# Patient Record
Sex: FEMALE | Race: WHITE | URBAN - METROPOLITAN AREA
[De-identification: names, ages, dates, MRNs, and addresses within clinical notes are randomized per-mention and may not be internally consistent; named-entity substitution may affect disease eponyms.]

---

## 2017-01-03 ENCOUNTER — PRE VISIT (OUTPATIENT)
Dept: DERMATOLOGY | Facility: CLINIC | Age: 37
End: 2017-01-03

## 2017-01-03 NOTE — TELEPHONE ENCOUNTER
Patients phone is not set up for voicemail. Unable to leave a voicemail to confirm appointment     Titi Thomas

## 2017-01-05 ENCOUNTER — OFFICE VISIT (OUTPATIENT)
Dept: DERMATOLOGY | Facility: CLINIC | Age: 37
End: 2017-01-05
Payer: COMMERCIAL

## 2017-01-05 DIAGNOSIS — L71.9 ROSACEA: Primary | ICD-10-CM

## 2017-01-05 DIAGNOSIS — L81.6 POIKILODERMA: ICD-10-CM

## 2017-01-05 DIAGNOSIS — D48.5 NEOPLASM OF UNCERTAIN BEHAVIOR OF SKIN OF CHEST: ICD-10-CM

## 2017-01-05 DIAGNOSIS — L57.8 SUN-DAMAGED SKIN: ICD-10-CM

## 2017-01-05 DIAGNOSIS — I78.1 TELANGIECTASIA: ICD-10-CM

## 2017-01-05 PROCEDURE — 99202 OFFICE O/P NEW SF 15 MIN: CPT | Performed by: DERMATOLOGY

## 2017-01-05 NOTE — NURSING NOTE
Dermatology Rooming Note    Jannie Kingsley's goals for this visit include:   Chief Complaint   Patient presents with     Derm Problem       Is a scribe okay for this visit:YES,     Are records needed for this visit(If yes, obtain release of information): No,      Vitals: There were no vitals taken for this visit.    Referring Provider:  No referring provider defined for this encounter.

## 2017-01-05 NOTE — PROGRESS NOTES
"[facility] Dermatology Note      Dermatology Problem List:  1. NUB on the chest: SK, BLK, NMSC. Recheck on 3 month follow up.  -s/p Pt deferred biopsy at this time. Pictures were taken.   2. Rosacea w/ Telangiectasia of face.  -s/p Discussed PDL    3. Frequent Tanning victoria use: will re-try talking her out of it @ next appt.    Encounter Date: Jan 5, 2017    CC:  Chief Complaint   Patient presents with     Derm Problem         History of Present Illness:  Ms. Jannie Kingsley is a 36 year old female who presents for evaluation of rosacea, skin naresh and dark spots. Today, the pt reports last year she noticed redness on her face and saw her Dermatologist Dr. Sharp. She was diagnosed with rosacea and was started on metro gel with no improvement. Her rosacea is characterized mainly by redness without bumps. It worsens when she gets out of the shower, noting \"my face gets really red.\" Pt uses makeup to hide the redness. Pt is not sure if spicy foods, sun-exposure, and red wine consumption effect her rosacea. Pt denies red gritty eyes. Pt admits to frequent use of tanning victoria.   In addition, pt notices a spot on her chest that has bleed when picked at. There is no other skin concerns at this time. Pt denies a hx of cold sores.     Past Medical History:   Patient Active Problem List   Diagnosis     Nexplanon in place     CARDIOVASCULAR SCREENING; LDL GOAL LESS THAN 160     Exercise-induced asthma     Bunion     Intermittent asthma     Past Medical History   Diagnosis Date     Asthma      Exercised induced      Seasonal allergies      Nexplanon in place      placed 1/3/13     Past Surgical History   Procedure Laterality Date     Breast surgery  11/2006     Implants, saline     Bunionectomy  11/1/13     left foot       Social History:  The patient works as a . The patient admits to frequent use of tanning beds. She admits to consumption of 7-14 alcoholic beverages a week and is a " nonsmoker.     Family History:  There is no family history of skin cancer or melanoma.    Medications:  Current Outpatient Prescriptions   Medication Sig Dispense Refill     spironolactone (ALDACTONE) 100 MG tablet TAKE ONE TABLET BY MOUTH ONE TIME DAILY 30 tablet 6     metroNIDAZOLE (METROGEL) 0.75 % gel Apply topically 2 times daily 45 g 11     glycopyrrolate (ROBINUL) 1 MG tablet Take 1 tablet (1 mg) by mouth 3 times daily 90 tablet 6     triamcinolone (KENALOG) 0.1 % cream Apply to affected area in axilla bid for 7-10 consecutive days, not to be used on face or groin 15 g 1     cetirizine (ZYRTEC) 10 MG tablet Take 1 tablet (10 mg) by mouth every evening 60 tablet 11     albuterol (PROAIR HFA, PROVENTIL HFA, VENTOLIN HFA) 108 (90 BASE) MCG/ACT inhaler Inhale 2 puffs into the lungs every 6 hours as needed for shortness of breath / dyspnea 1 Inhaler 0       Allergies   Allergen Reactions     Penicillins Rash     Fever       Review of Systems:  -Skin/Heme New Pt: The patient denies frequent sun exposure. The patient denies excessive scarring or problems healing except as per HPI. The patient denies excessive bleeding. The pt admits to frequent use of tanning booths.   -Constitutional: The patient is otherwise feeling well.       Physical exam:  Vitals: There were no vitals taken for this visit.  GEN: This is a well-nourished, well developed female in no acute distress  NEURO: Alert and oriented  PSYCH: in a pleasant mood, appropriate affect  SKIN: Focused examination of the Head, face, neck, upper chest was performed.  -There is redness across the cheek, across the nose, and chin.   -There is redness and telangiectasia, poikiloderma around anterior neck and upper chest.   -1.5 cm slightly pearly papule on right medial breast  -No other lesions of concern on areas examined.       Impression/Plan:  1. Neoplasm of uncertain behavior on the chest. DDX: BCC, SK, BLK. Given history of tanning victoria use, recommended  biopsy but patient deferred against medical recommendation.    Pt deferred biopsy at this time. She prefers to monitor lesion for any changes in size or appearance.    Pictures were taken today.   2. Rosacea w/ Telangiectasia     Discussed PDL treatment with pt. Pt is interested in this method of treatment and scheduled for cosmetic treatment. WArned that it may take 3 treatments to achieve adequate results. Side effect eye damage, bruising, redness, swelling, scarring among others.  3. 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    I really tried to talk the patient out of more tanning victoria use. I emphasize skin cancer, melanoma, death, skin aging but I dont' know if I really go through to her.       Follow-up in 3-6 months, earlier for new or changing lesions. And to re-examin lesion on the Right chest/medial breast.      Staff Involved:  Scribe/Staff      Scribe Disclosure:   I, Guerrero Walters, am serving as a scribe to document services personally performed by Dr. Pawel Obrien, based on data collection and the provider's statements to me.     Provider Disclosure:   I have reviewed the documentation recorded by the scribe and have edited it as needed. I have personally performed the services documented here and the documentation accurately represents those services and the decisions made by me.     Pawel Obrien MD, MS    Department of Dermatology  [facility]: Phone: [phone], Fax:[phone]  [facility]: Phone: [phone], Fax: [phone]

## 2017-01-05 NOTE — PATIENT INSTRUCTIONS
[facility]    I will experience redness, swelling, pain, and heat sensation. I may experience bruising, itching, or acne. Risks are blistering, oozing, permanent scarring, hair loss,  temporary or permanent skin lightening or darkening, infection, and eye injury. I understand my outcome could be no improvement, slight improvement. Multiple treatments may be required.    After treatment, Do Not:    Rub, scratch, or put weight on the site for 2 weeks    Wear tight fitting clothing or jewelry over the site    [place]. Keep the site out of sunlight. Use sunscreen of 30 SPF or greater when in the sun. Use sunscreen 30 minutes before going out and reapply if sweating. Tanning decreases the success of the treatment     How do I care for the treated site?    Use ice packs for 10 minutes after the procedure for swelling     If the site is on your face, use ice again 1 hour after treatment    If a scab or crust forms, gently cleanse the site with hydrogen peroxide. Then put on Vaseline  ointment 3 times a day    Do not use makeup on any open wound    What should I expect?    Blue-gray color that may take 2 to 3 weeks to go away    Redness may also last a week or longer    Results may take up to 3 or 4 months after treatment    More procedures may be needed    Who should I call with questions?    [facility]: [phone]     [facility]: [phone]    For urgent needs outside of business hours call the [facility] at [phone] and ask for the dermatology resident on call      For Full face PDL- 450$ each treatment.

## 2017-01-05 NOTE — MR AVS SNAPSHOT
After Visit Summary   1/5/2017    Jannie Kingsley    MRN: [id number]           Patient Information     Date Of Birth          1980        Visit Information        Provider Department      1/5/2017 1:00 PM Pawel Obrien MD [facility]        Today's Diagnoses     Rosacea    -  1     Telangiectasia         Neoplasm of uncertain behavior of skin of chest         Poikiloderma           Care Instructions    Pulse Dye Laser    I will experience redness, swelling, pain, and heat sensation. I may experience bruising, itching, or acne. Risks are blistering, oozing, permanent scarring, hair loss,  temporary or permanent skin lightening or darkening, infection, and eye injury. I understand my outcome could be no improvement, slight improvement. Multiple treatments may be required.    After treatment, Do Not:    Rub, scratch, or put weight on the site for 2 weeks    Wear tight fitting clothing or jewelry over the site    [place]claughlin. Keep the site out of sunlight. Use sunscreen of 30 SPF or greater when in the sun. Use sunscreen 30 minutes before going out and reapply if sweating. Tanning decreases the success of the treatment     How do I care for the treated site?    Use ice packs for 10 minutes after the procedure for swelling     If the site is on your face, use ice again 1 hour after treatment    If a scab or crust forms, gently cleanse the site with hydrogen peroxide. Then put on Vaseline  ointment 3 times a day    Do not use makeup on any open wound    What should I expect?    Blue-gray color that may take 2 to 3 weeks to go away    Redness may also last a week or longer    Results may take up to 3 or 4 months after treatment    More procedures may be needed    Who should I call with questions?    [facility]: [phone]     [facility]: [phone]    For urgent needs outside of business hours call the [facility] at  [phone] and ask for the dermatology resident on call      For Full face PDL- 450$ each treatment.         Follow-ups after your visit        Your next 10 appointments already scheduled     Jan 13, 2017  2:00 PM   Office Visit with Beatrice Madison MD   [facility] ([facility] )    [address] [place] [address]  [address] [place] [id number] [phone]           Bring a current list of meds and any records pertaining to this visit.  For Physicals, please bring immunization records and any forms needing to be filled out.  Please arrive 10 minutes early to complete paperwork.            Feb 10, 2017 10:00 AM   Return Visit with Pawel Obrien MD   [facility] ([facility])    [address] [place] [id number] [phone]              Who to contact     If you have questions or need follow up information about today's clinic visit or your schedule please contact [facility] directly at [phone].  Normal or non-critical lab and imaging results will be communicated to you by [employer], letter or phone within 4 business days after the clinic has received the results. If you do not hear from us within 7 days, please contact the clinic through [employer] or phone. If you have a critical or abnormal lab result, we will notify you by phone as soon as possible.  Submit refill requests through [employer] or call your pharmacy and they will forward the refill request to us. Please allow 3 business days for your refill to be completed.          Additional Information About Your Visit        [employer] Information     [employer] gives you secure access to your electronic health record. If you see a primary care provider, you can also send messages to your care team and make appointments. If you have questions, please call your primary care clinic.  If you do not have a primary care provider, please call  [phone] and they will assist you.      [employer] is an electronic gateway that provides easy, online access to your medical records. With [employer], you can request a clinic appointment, read your test results, renew a prescription or communicate with your care team.     To access your existing account, please contact your [facility] or call [phone] for assistance.        Care EveryWhere ID     This is your Care EveryWhere ID. This could be used by other organizations to access your [facility] medical records  [id number]         Blood Pressure from Last 3 Encounters:   06/03/14 112/76   02/20/14 118/68   12/31/13 118/82    Weight from Last 3 Encounters:   06/03/14 57.108 kg (125 lb 14.4 oz)   02/20/14 56.7 kg (125 lb)   12/31/13 59.784 kg (131 lb 12.8 oz)              Today, you had the following     No orders found for display       Primary Care Provider Office Phone # Fax #    TRAN [facility] [phone]       [facility] [address] [facility] [address]        Thank you!     Thank you for choosing [facility]  for your care. Our goal is always to provide you with excellent care. Hearing back from our patients is one way we can continue to improve our services. Please take a few minutes to complete the written survey that you may receive in the mail after your visit with us. Thank you!             Your Updated Medication List - Protect others around you: Learn how to safely use, store and throw away your medicines at www.disposemymeds.org.          This list is accurate as of: 1/5/17  1:18 PM.  Always use your most recent med list.                   Brand Name Dispense Instructions for use    albuterol 108 (90 BASE) MCG/ACT Inhaler    PROAIR HFA/PROVENTIL HFA/VENTOLIN HFA    1 Inhaler    Inhale 2 puffs into the lungs every 6 hours as needed for shortness of breath / dyspnea       cetirizine 10 MG tablet    zyrTEC    60  tablet    Take 1 tablet (10 mg) by mouth every evening       glycopyrrolate 1 MG tablet    ROBINUL    90 tablet    Take 1 tablet (1 mg) by mouth 3 times daily       metroNIDAZOLE 0.75 % topical gel    METROGEL    45 g    Apply topically 2 times daily       spironolactone 100 MG tablet    ALDACTONE    30 tablet    TAKE ONE TABLET BY MOUTH ONE TIME DAILY       triamcinolone 0.1 % cream    KENALOG    15 g    Apply to affected area in axilla bid for 7-10 consecutive days, not to be used on face or groin

## 2017-01-13 ENCOUNTER — OFFICE VISIT (OUTPATIENT)
Dept: FAMILY MEDICINE | Facility: CLINIC | Age: 37
End: 2017-01-13
Payer: COMMERCIAL

## 2017-01-13 DIAGNOSIS — L74.510 AXILLARY HYPERHIDROSIS: Primary | ICD-10-CM

## 2017-01-13 PROCEDURE — 64650 CHEMODENERV ECCRINE GLANDS: CPT | Performed by: FAMILY MEDICINE

## 2017-01-13 NOTE — MR AVS SNAPSHOT
After Visit Summary   1/13/2017    Jannie Kingsley    MRN: [id number]           Patient Information     Date Of Birth          1980        Visit Information        Provider Department      1/13/2017 2:00 PM Beatrice Madison MD [facility]        Today's Diagnoses     Axillary hyperhidrosis    -  1       Care Instructions    FUTURE APPOINTMENTS  Fllow up as needed in 4-6 months.    HYPERHIDROSIS POST-TREATMENT CARE INSTRUCTIONS  Gently apply a cool compress or wrapped ice pack to the treated areas for 15 minutes every few hours as needed to reduce discomfort, swelling, or bruising up to a few days after treatment. If bruising should occur, it will typically resolve within 7-10 days.    Avoid these on the day of treatment:    Do not massage the treated areas.    Do not apply deodorant on the treated areas for 24 hours.    Avoid applying heat to the treated area.    Avoid activities such as hot tub/sauna use, exercise, tanning and other activities that can cause flushing.    Shave underarms and do not use over-the-counter deodorants or anti-perspirants for 24 hours prior to next treatment.    Botulinium toxin (BOTOX) treatment effects take 1-2 weeks to fully develop and last for approximately 6 months.    Signs of Infection:  Infection can occur in any area where skin has been disrupted.  If you notice persistent redness, swelling, colored drainage, increasing pain, fever or other signs of infection, please call us at: [phone] and ask to have me or my colleague paged. We will call you back to discuss.    HYPERHIDROSIS PRE-TREATMENT CARE INSTRUCTIONS    Shave underarms and do not use over-the-counter deodorants or anti-perspirants for 24 hours prior to treatment.    Avoid ibuprofen (e.g. Advil, Motrin) for 2 days prior to treatment.    Avoid alcohol for 2 days prior to treatment.    Avoid aspirin (e.g. Excedrin) and any products containing acetylsalicylic  acid, Vitamin E, Ishpeming's Wort and other dietary supplements including Evening Primrose, Feverfew, Garlic, Ginkgo, and Ginseng for 2 weeks prior to treatment.        Follow-ups after your visit        Your next 10 appointments already scheduled     Feb 10, 2017 10:00 AM   Return Visit with Pawel Obrien MD   [facility] ([facility])    [id number] [place] [id number] [phone]              Who to contact     If you have questions or need follow up information about today's clinic visit or your schedule please contact [facility] directly at [phone].  Normal or non-critical lab and imaging results will be communicated to you by MyChart, letter or phone within 4 business days after the clinic has received the results. If you do not hear from us within 7 days, please contact the clinic through [employer] or phone. If you have a critical or abnormal lab result, we will notify you by phone as soon as possible.  Submit refill requests through [employer] or call your pharmacy and they will forward the refill request to us. Please allow 3 business days for your refill to be completed.          Additional Information About Your Visit        MyChart Information     [employer] gives you secure access to your electronic health record. If you see a primary care provider, you can also send messages to your care team and make appointments. If you have questions, please call your primary care clinic.  If you do not have a primary care provider, please call [phone] and they will assist you.        Care EveryWhere ID     This is your Care EveryWhere ID. This could be used by other organizations to access your [facility] medical records  [id number]         Blood Pressure from Last 3 Encounters:   06/03/14 112/76   02/20/14 118/68   12/31/13 118/82    Weight from Last 3 Encounters:   06/03/14 125 lb 14.4 oz (57.108 kg)   02/20/14 125 lb (56.7 kg)    12/31/13 131 lb 12.8 oz (59.784 kg)              Today, you had the following     No orders found for display       Primary Care Provider Office Phone # Fax #    [facility] [phone]       [facility] [address] [facility] [address]        Thank you!     Thank you for choosing [facility]  for your care. Our goal is always to provide you with excellent care. Hearing back from our patients is one way we can continue to improve our services. Please take a few minutes to complete the written survey that you may receive in the mail after your visit with us. Thank you!             Your Updated Medication List - Protect others around you: Learn how to safely use, store and throw away your medicines at www.disposemymeds.org.          This list is accurate as of: 1/13/17  2:17 PM.  Always use your most recent med list.                   Brand Name Dispense Instructions for use    albuterol 108 (90 BASE) MCG/ACT Inhaler    PROAIR HFA/PROVENTIL HFA/VENTOLIN HFA    1 Inhaler    Inhale 2 puffs into the lungs every 6 hours as needed for shortness of breath / dyspnea       cetirizine 10 MG tablet    zyrTEC    60 tablet    Take 1 tablet (10 mg) by mouth every evening       glycopyrrolate 1 MG tablet    ROBINUL    90 tablet    Take 1 tablet (1 mg) by mouth 3 times daily       spironolactone 100 MG tablet    ALDACTONE    30 tablet    TAKE ONE TABLET BY MOUTH ONE TIME DAILY       triamcinolone 0.1 % cream    KENALOG    15 g    Apply to affected area in axilla bid for 7-10 consecutive days, not to be used on face or groin

## 2017-01-13 NOTE — PATIENT INSTRUCTIONS
FUTURE APPOINTMENTS  Fllow up as needed in 4-6 months.    HYPERHIDROSIS POST-TREATMENT CARE INSTRUCTIONS  Gently apply a cool compress or wrapped ice pack to the treated areas for 15 minutes every few hours as needed to reduce discomfort, swelling, or bruising up to a few days after treatment. If bruising should occur, it will typically resolve within 7-10 days.    Avoid these on the day of treatment:    Do not massage the treated areas.    Do not apply deodorant on the treated areas for 24 hours.    Avoid applying heat to the treated area.    Avoid activities such as hot tub/sauna use, exercise, tanning and other activities that can cause flushing.    Shave underarms and do not use over-the-counter deodorants or anti-perspirants for 24 hours prior to next treatment.    Botulinium toxin (BOTOX) treatment effects take 1-2 weeks to fully develop and last for approximately 6 months.    Signs of Infection:  Infection can occur in any area where skin has been disrupted.  If you notice persistent redness, swelling, colored drainage, increasing pain, fever or other signs of infection, please call us at: [phone] and ask to have me or my colleague paged. We will call you back to discuss.    HYPERHIDROSIS PRE-TREATMENT CARE INSTRUCTIONS    Shave underarms and do not use over-the-counter deodorants or anti-perspirants for 24 hours prior to treatment.    Avoid ibuprofen (e.g. Advil, Motrin) for 2 days prior to treatment.    Avoid alcohol for 2 days prior to treatment.    Avoid aspirin (e.g. Excedrin) and any products containing acetylsalicylic acid, Vitamin E, Khris's Wort and other dietary supplements including Evening Primrose, Feverfew, Garlic, Ginkgo, and Ginseng for 2 weeks prior to treatment.

## 2017-01-13 NOTE — PROGRESS NOTES
[facility] - PRIMARY CARE SKIN    CC : axillary hyperhidrosis  SUBJECTIVE:                                                    Jannie Kingsley is a 35 year old female who presents to clinic today with  for follow-up if hyperhidrosis, which has been an issue over the last 2-3 years. Use of topical deodorant is irritating to the skin.    Previous BOTOX injection treatments :   4/15/2016 - 100 units with excellent results.  8/12/2016 - 100 units with excellent results.    Excessive sweating impact : Destroys room clothing, interference with work environment and social inhibition.  Previous ineffective treatments : OTC and prescription anti-perspirants, Robinul.  Family history of excessive sweating : NO, thyroid disorder : NO.    Occupation :  (indoor).    Patient Active Problem List   Diagnosis     Nexplanon in place     CARDIOVASCULAR SCREENING; LDL GOAL LESS THAN 160     Exercise-induced asthma     Bunion     Intermittent asthma       Past Medical History   Diagnosis Date     Asthma      Exercised induced      Seasonal allergies      Nexplanon in place      placed 1/3/13    Past Surgical History   Procedure Laterality Date     Breast surgery  11/2006     Implants, saline     Bunionectomy  11/1/13     left foot      Social History   Substance Use Topics     Smoking status: Never Smoker      Smokeless tobacco: Never Used     Alcohol Use: 2.0 oz/week     4 drink(s) per week      Comment: 4 drinks per week    Family History     Problem (# of Occurrences) Relation (Name,Age of Onset)    CANCER (2) Mother: cervical, Maternal Grandmother: Stomach cancer       Negative family history of: Asthma, C.A.D., DIABETES, Hypertension, CEREBROVASCULAR DISEASE, Breast Cancer, Cancer - colorectal, Prostate Cancer, Alzheimer Disease, Anesthesia Reaction, Arthritis, Alcohol/Drug, Allergies, Blood Disease, Cardiovascular, Connective Tissue Disorder, Congenital Anomalies, Circulatory, Depression, Endocrine Disease,  Eye Disorder, Genetic Disorder, GASTROINTESTINAL DISEASE, Genitourinary Problems, Gynecology, HEART DISEASE, Lipids, Musculoskeletal Disorder, Neurologic Disorder, Obesity, OSTEOPOROSIS, Psychotic Disorder, Respiratory, Thyroid Disease, Family History Negative, Hearing Loss           Prescription Medications as of 1/13/2017             spironolactone (ALDACTONE) 100 MG tablet TAKE ONE TABLET BY MOUTH ONE TIME DAILY    metroNIDAZOLE (METROGEL) 0.75 % gel Apply topically 2 times daily    glycopyrrolate (ROBINUL) 1 MG tablet Take 1 tablet (1 mg) by mouth 3 times daily    triamcinolone (KENALOG) 0.1 % cream Apply to affected area in axilla bid for 7-10 consecutive days, not to be used on face or groin    cetirizine (ZYRTEC) 10 MG tablet Take 1 tablet (10 mg) by mouth every evening    albuterol (PROAIR HFA, PROVENTIL HFA, VENTOLIN HFA) 108 (90 BASE) MCG/ACT inhaler Inhale 2 puffs into the lungs every 6 hours as needed for shortness of breath / dyspnea            Allergies   Allergen Reactions     Penicillins Rash     Fever        ROS : 14 point review of systems was negative except the symptoms listed above in the HPI.    This document serves as a record of the services and decisions personally performed and made by Tabatha Madison MD. It was created on her behalf by Jackson Gresham, a trained medical scribe.  The creation of this document is based on the scribe's personal observations and the provider's statements to the medical scribe.  Jackson Gresham, January 13, 2017 1:48 PM      OBJECTIVE:                                                    GENERAL: healthy, alert and no distress  SKIN: Wong Skin Type - I.  Trunk were examined. The dermatoscope was used to help evaluate pigmented lesions.  Skin Pertinent Findings:  Axillae : Skin appears healthy.     Diagnostic Test Results:  none     MDM :  Discussed compliance with medication, change in treatment plan, and expectations for treatment response. Treatment options for  control of the axillary sweating was previously discussed.      ASSESSMENT:                                                      Encounter Diagnosis   Name Primary?     Axillary hyperhidrosis Yes         PLAN:                                                      Patient Instructions   FUTURE APPOINTMENTS  Fllow up as needed in 4-6 months.    HYPERHIDROSIS POST-TREATMENT CARE INSTRUCTIONS  Gently apply a cool compress or wrapped ice pack to the treated areas for 15 minutes every few hours as needed to reduce discomfort, swelling, or bruising up to a few days after treatment. If bruising should occur, it will typically resolve within 7-10 days.    Avoid these on the day of treatment:    Do not massage the treated areas.    Do not apply deodorant on the treated areas for 24 hours.    Avoid applying heat to the treated area.    Avoid activities such as hot tub/sauna use, exercise, tanning and other activities that can cause flushing.    Shave underarms and do not use over-the-counter deodorants or anti-perspirants for 24 hours prior to next treatment.    Botulinium toxin (BOTOX) treatment effects take 1-2 weeks to fully develop and last for approximately 6 months.    Signs of Infection:  Infection can occur in any area where skin has been disrupted.  If you notice persistent redness, swelling, colored drainage, increasing pain, fever or other signs of infection, please call us at: [phone] and ask to have me or my colleague paged. We will call you back to discuss.    HYPERHIDROSIS PRE-TREATMENT CARE INSTRUCTIONS    Shave underarms and do not use over-the-counter deodorants or anti-perspirants for 24 hours prior to treatment.    Avoid ibuprofen (e.g. Advil, Motrin) for 2 days prior to treatment.    Avoid alcohol for 2 days prior to treatment.    Avoid aspirin (e.g. Excedrin) and any products containing acetylsalicylic acid, Vitamin E, Khris's Wort and other dietary supplements including Evening Primrose,  "Feverfew, Garlic, Ginkgo, and Ginseng for 2 weeks prior to treatment.        PROCEDURES:                                                    Name : Botulinum Toxin (BOTOX) injection.  Indication : axillary hyperhidrosis with previously failed conservative treatment.  Location(s) : Both axillae.  Completed by : Tabatha Madison MD  Note : Discussed the risk of pain, infection, scarring, hypo- or hyperpigmentation and recurrence or need for re-treatment. Written pre- and post- treatment instructions were given to the patient and reviewed. The benefits and risks of treatment and alternative treatments were also discussed.    During this procedure, the universal protocol was utilized. The patient's identity was confirmed by no less than two patient identifiers, correct procedure was verified, correct site was verified and marked as applicable and a final pause was completed.    Sterile technique was used throughout the procedure. The skin was cleaned and prepped with surgical cleanser. Intradermal injections with BOTOX were done using a 32 gauge 1/2\" needle, creating a small wheal, spaced 1 cm apart and in a staggered fashion. No bleeding was present upon the completion of the procedure.    Botox : Lot: # [id number], Expiration date: Aug 2019.  Total units injected : 100 units.    Patient tolerated procedure well and left the room in satisfactory condition.  Treatment number : 3.    The information in this document, created by the medical scribe for me, accurately reflects the services I personally performed and the decisions made by me. I have reviewed and approved this document for accuracy prior to leaving the patient care area.  Tabatha Madison MD January 13, 2017 1:49 PM  [facility] - PRIMARY CARE SKIN  "

## 2017-02-10 ENCOUNTER — OFFICE VISIT (OUTPATIENT)
Dept: DERMATOLOGY | Facility: CLINIC | Age: 37
End: 2017-02-10

## 2017-02-10 DIAGNOSIS — L71.9 ROSACEA: Primary | ICD-10-CM

## 2017-02-10 PROCEDURE — 96999 UNLISTED SPEC DERM SVC/PX: CPT | Performed by: DERMATOLOGY

## 2017-02-10 NOTE — PROGRESS NOTES
Laser- VBeam(Pulsed Dye Laser) Procedure Note: Cosmetic    Procedure Date: Feb 10, 2017    Attending Staff Surgeon: Dr. Pawel Obrien    Resident Surgeon: None    Assistant: Kelsey Grace    Operating Room Data:     Surgery/Procedure Date:    SAME     Pre-operative Diagnosis:   ET Rosacea  Location: bilateral cheeks, nose, chin  Size: more than 10 but less than 50 square cm    Operation/Procedure    Vbeam pulsed dye laser treatment#: 1       Post-operative Diagnosis:  SAME    Laser Settings:  Energy: 7 J/cm2  Spot size:10mm  Pulse width:  6 mS (0.45 thru 40 mS)  Dynamic cooling spray settin mS  Dynamic cooling device delay:  20 mS      Anesthesia:  None    Description of Operation/Procedure:   The nature and purpose of the procedure, associated risks, possible consequences, complications and alternative methods of treatment were explained in detail, this includes but is not limited to hyperpigmentation, hypopigmentation, scarring, bruising, hair loss pain/discomfort, eye injury, and blister.   We reviewed that the outcome could be any of the following: no improvement, slight improvement or change in skin color & texture, the skin might be permanently lighter or darker, and though uncommon, superficial scarring may occur.  Multiple treatments may be recommended.   A photo  and operative consent were obtained. Time-out was performed.  The patient was positioned to optimally expose the area treated.  Protective eyewear was worn by the patient and goggles on all personnel in the treatment room.  The laser energy output was verified by meter reading.     The clinically evident lesion(s) was/were treated with Myesha Vbeam pulsed dye laser (595 nm) beam as above.  A total of 231 pulses were used.  The patient tolerated the procedure well and no complications were noted. The total laser operation and preparation time was 30 minutes.      The patient will pay the cosmetic fee today --> 4 sites.     Dr. Obrien staffed the  patient was present for the entire procedure.    The patient will follow-up in 1 month.      Staff Involved:  Staff Only      Pawel Obrien MD, MS      Department of Dermatology   [facility]: Phone: [phone], Fax:[phone]   [facility]: Phone: [phone], Fax: [phone]    Pre-operative photos:

## 2017-02-10 NOTE — MR AVS SNAPSHOT
After Visit Summary   2/10/2017    Jannie Dexter    MRN: [id number]           Patient Information     Date Of Birth          1980        Visit Information        Provider Department      2/10/2017 10:00 AM Pawel Obrien MD [facility]        Care Instructions    Pulse Dye Laser    I will experience redness, swelling, pain, and heat sensation. I may experience bruising, itching, or acne. Risks are blistering, oozing, permanent scarring, hair loss,  temporary or permanent skin lightening or darkening, infection, and eye injury. I understand my outcome could be no improvement, slight improvement. Multiple treatments may be required.    After treatment, Do Not:    Rub, scratch, or put weight on the site for 2 weeks    Wear tight fitting clothing or jewelry over the site    Mclaughlin. Keep the site out of sunlight. Use sunscreen of 30 SPF or greater when in the sun. Use sunscreen 30 minutes before going out and reapply if sweating. Tanning decreases the success of the treatment     How do I care for the treated site?    Use ice packs for 10 minutes after the procedure for swelling     If the site is on your face, use ice again 1 hour after treatment    If a scab or crust forms, gently cleanse the site with hydrogen peroxide. Then put on Vaseline  ointment 3 times a day    Do not use makeup on any open wound    What should I expect?    Blue-gray color that may take 2 to 3 weeks to go away    Redness may also last a week or longer    Results may take up to 3 or 4 months after treatment    More procedures may be needed    Who should I call with questions?    [facility]: [phone]     [facility]: [phone]    For urgent needs outside of business hours call the [facility] at [phone] and ask for the dermatology resident on call        Follow-ups after your visit        Your next 10 appointments already  scheduled     Mar 13, 2017 11:15 AM   Return Visit with Pawel Obrien MD   [facility]    [address] [address] [place] [id number] [phone]              Who to contact     If you have questions or need follow up information about today's clinic visit or your schedule please contact [facility] directly at [phone].  Normal or non-critical lab and imaging results will be communicated to you by [employer], letter or phone within 4 business days after the clinic has received the results. If you do not hear from us within 7 days, please contact the clinic through [employer] or phone. If you have a critical or abnormal lab result, we will notify you by phone as soon as possible.  Submit refill requests through [employer] or call your pharmacy and they will forward the refill request to us. Please allow 3 business days for your refill to be completed.          Additional Information About Your Visit        [employer] Information     [employer] gives you secure access to your electronic health record. If you see a primary care provider, you can also send messages to your care team and make appointments. If you have questions, please call your primary care clinic.  If you do not have a primary care provider, please call [phone] and they will assist you.      [employer] is an electronic gateway that provides easy, online access to your medical records. With [employer], you can request a clinic appointment, read your test results, renew a prescription or communicate with your care team.     To access your existing account, please contact your [facility] or call [phone] for assistance.        Care EveryWhere ID     This is your Care EveryWhere ID. This could be used by other organizations to access your [facility] medical records  [id number]         Blood Pressure from Last 3 Encounters:   06/03/14 112/76   02/20/14 118/68    12/31/13 118/82    Weight from Last 3 Encounters:   06/03/14 57.108 kg (125 lb 14.4 oz)   02/20/14 56.7 kg (125 lb)   12/31/13 59.784 kg (131 lb 12.8 oz)              Today, you had the following     No orders found for display       Primary Care Provider Office Phone # Fax #    Devi Valenzuela TRAN [facility] [phone]       [facility] [address] [facility] [address]        Thank you!     Thank you for choosing [facility]  for your care. Our goal is always to provide you with excellent care. Hearing back from our patients is one way we can continue to improve our services. Please take a few minutes to complete the written survey that you may receive in the mail after your visit with us. Thank you!             Your Updated Medication List - Protect others around you: Learn how to safely use, store and throw away your medicines at www.disposemymeds.org.          This list is accurate as of: 2/10/17 10:22 AM.  Always use your most recent med list.                   Brand Name Dispense Instructions for use    albuterol 108 (90 BASE) MCG/ACT Inhaler    PROAIR HFA/PROVENTIL HFA/VENTOLIN HFA    1 Inhaler    Inhale 2 puffs into the lungs every 6 hours as needed for shortness of breath / dyspnea       cetirizine 10 MG tablet    zyrTEC    60 tablet    Take 1 tablet (10 mg) by mouth every evening       glycopyrrolate 1 MG tablet    ROBINUL    90 tablet    Take 1 tablet (1 mg) by mouth 3 times daily       spironolactone 100 MG tablet    ALDACTONE    30 tablet    TAKE ONE TABLET BY MOUTH ONE TIME DAILY       triamcinolone 0.1 % cream    KENALOG    15 g    Apply to affected area in axilla bid for 7-10 consecutive days, not to be used on face or groin

## 2017-02-10 NOTE — PATIENT INSTRUCTIONS
Pulse Dye Laser    I will experience redness, swelling, pain, and heat sensation. I may experience bruising, itching, or acne. Risks are blistering, oozing, permanent scarring, hair loss,  temporary or permanent skin lightening or darkening, infection, and eye injury. I understand my outcome could be no improvement, slight improvement. Multiple treatments may be required.    After treatment, Do Not:    Rub, scratch, or put weight on the site for 2 weeks    Wear tight fitting clothing or jewelry over the site    Mclaughlin. Keep the site out of sunlight. Use sunscreen of 30 SPF or greater when in the sun. Use sunscreen 30 minutes before going out and reapply if sweating. Tanning decreases the success of the treatment     How do I care for the treated site?    Use ice packs for 10 minutes after the procedure for swelling     If the site is on your face, use ice again 1 hour after treatment    If a scab or crust forms, gently cleanse the site with hydrogen peroxide. Then put on Vaseline  ointment 3 times a day    Do not use makeup on any open wound    What should I expect?    Blue-gray color that may take 2 to 3 weeks to go away    Redness may also last a week or longer    Results may take up to 3 or 4 months after treatment    More procedures may be needed    Who should I call with questions?    [facility]: [phone]     [facility]: [phone]    For urgent needs outside of business hours call the [facility] at [phone] and ask for the dermatology resident on call

## 2017-02-10 NOTE — NURSING NOTE
Dermatology Rooming Note    Jannie Dexter's goals for this visit include:   Chief Complaint   Patient presents with     Procedure       Is a scribe okay for this visit:Yes      Are records needed for this visit(If yes, obtain release of information): No,      Vitals: There were no vitals taken for this visit.    Referring Provider:  No referring provider defined for this encounter.

## 2017-03-13 ENCOUNTER — OFFICE VISIT (OUTPATIENT)
Dept: DERMATOLOGY | Facility: CLINIC | Age: 37
End: 2017-03-13
Payer: COMMERCIAL

## 2017-03-13 DIAGNOSIS — D48.5 NEOPLASM OF UNCERTAIN BEHAVIOR OF SKIN OF CHEST: ICD-10-CM

## 2017-03-13 DIAGNOSIS — L71.9 ROSACEA: Primary | ICD-10-CM

## 2017-03-13 PROCEDURE — 88305 TISSUE EXAM BY PATHOLOGIST: CPT | Performed by: DERMATOLOGY

## 2017-03-13 PROCEDURE — 99213 OFFICE O/P EST LOW 20 MIN: CPT | Mod: 25 | Performed by: DERMATOLOGY

## 2017-03-13 PROCEDURE — 11100 HC BIOPSY SKIN/SUBQ/MUC MEM, SINGLE LESION: CPT | Performed by: DERMATOLOGY

## 2017-03-13 RX ORDER — LIDOCAINE HYDROCHLORIDE AND EPINEPHRINE 10; 10 MG/ML; UG/ML
3 INJECTION, SOLUTION INFILTRATION; PERINEURAL ONCE
Qty: 0.5 ML | Refills: 0 | OUTPATIENT
Start: 2017-03-13 | End: 2017-03-13

## 2017-03-13 NOTE — PROGRESS NOTES
[facility] Dermatology Note      Dermatology Problem List:  1. NUB on the chest: SCC, BCC, BLK, AK, irritated cherry angioma.   -s/p shave biopsy 3/13/2017  2. Rosacea w/ Telangiectasia of face.  -s/p PDL 2/10/2017 (full face)  -Anticipated Next PDL settings --> decreasing purpura  Energy: 8 J/cm2 (Inc from 7)  Spot size:10mm  Pulse width: 10 mS (Inc from 6)  3. Frequent Tanning victoria use: will continue talking her out of it    Encounter Date: Mar 13, 2017    CC:  Chief Complaint   Patient presents with     RECHECK     post pdl 4 weeks redness is better but not where she wants it yet          History of Present Illness:  Ms. Jannie Kingsley is a 36 year old female who presents as a follow up post PDL treatment for rosacea and NUB on chest. She was last seen 2/10/2017 when pt underwent PDL for rosacea. Today, the pt reports the PDL improved the outside of her cheeks and parts of her nose but pt states the bruising was really intense. She is otherwise pleased with her results and is ready for another round of treatment. Her rosacea worsens right after a hot shower. She is interested in another session but will do it after her wedding and honeymoon in [place].  In addition, pt states she is ready to biopsy the spot on her chest. It has been growing and shrinking in size, as well as, spontaneously bleeding. No other skin concerns at this time.     Past Medical History:   Patient Active Problem List   Diagnosis     Nexplanon in place     CARDIOVASCULAR SCREENING; LDL GOAL LESS THAN 160     Exercise-induced asthma     Bunion     Intermittent asthma     Past Medical History   Diagnosis Date     Asthma      Exercised induced      Nexplanon in place      placed 1/3/13     Seasonal allergies      Past Surgical History   Procedure Laterality Date     Breast surgery  11/2006     Implants, saline     Bunionectomy  11/1/13     left foot       Social History:  Wedding and [employer] in [place] in May 2017  The  patient works as a . The patient admits to frequent use of tanning beds. She admits to consumption of 7-14 alcoholic beverages a week and is a nonsmoker.     Family History:  Reviewed and unchanged but kept in chart for clinician convenience  There is no family history of skin cancer or melanoma.    Medications:  Current Outpatient Prescriptions   Medication Sig Dispense Refill     spironolactone (ALDACTONE) 100 MG tablet TAKE ONE TABLET BY MOUTH ONE TIME DAILY 30 tablet 6     glycopyrrolate (ROBINUL) 1 MG tablet Take 1 tablet (1 mg) by mouth 3 times daily 90 tablet 6     triamcinolone (KENALOG) 0.1 % cream Apply to affected area in axilla bid for 7-10 consecutive days, not to be used on face or groin 15 g 1     cetirizine (ZYRTEC) 10 MG tablet Take 1 tablet (10 mg) by mouth every evening 60 tablet 11     albuterol (PROAIR HFA, PROVENTIL HFA, VENTOLIN HFA) 108 (90 BASE) MCG/ACT inhaler Inhale 2 puffs into the lungs every 6 hours as needed for shortness of breath / dyspnea 1 Inhaler 0       Allergies   Allergen Reactions     Penicillins Rash     Fever       Review of Systems:  -Constitutional: The patient is otherwise feeling well.       Physical exam:  Vitals: There were no vitals taken for this visit.  GEN: This is a well-nourished, well developed female in no acute distress  NEURO: Alert and oriented  PSYCH: in a pleasant mood, appropriate affect  SKIN: Focused examination of the Head, face, neck, upper chest was performed.  -There is mild erythema on the forehead and lateral face, improved since prior to PDL  -resolution of telangiectasias on the cheeks.  -There are minimal lacy erythema on the forehead and nose.   -On the right medial chest there is a 8 mm sized pink papule with excoriation.  -No other lesions of concern on areas examined.       Impression/Plan:  1. Neoplasm of uncertain behavior on the right chest. DDX: BCC, SCC, BLK, AK, irritated cherry angioma . Given history of tanning victoria  use.    Shave biopsy:  After discussion of benefits and risks including but not limited to bleeding/bruising, pain/swelling, infection, scar, incomplete removal, nerve damage/numbness, recurrence, and non-diagnostic biopsy, written consent, verbal consent and photographs were obtained. Time-out was performed. The area was cleaned with isopropyl alcohol. 0.5mL of 1% lidocaine with epinephrine was injected to obtain adequate anesthesia of the lesion on the right chest. A shave biopsy was performed. Hemostasis was achieved with aluminium chloride. Vaseline and a sterile dressing were applied. The patient tolerated the procedure and no complications were noted. The patient was provided with verbal and written post care instructions.   2. Rosacea w/ Telangiectasia: improved with 1 PDL. New photos taken. Pt will need another PDL but focused on the nose and cheek but better AFTER wedding in case things other.  Will only treat nose and cheeks most likely.     Pt is interested in another round of PDL treatment.  method of treatment and scheduled for cosmetic treatment. WArned that it may take 3 treatments to achieve adequate results. Side effect eye damage, bruising, redness, swelling, scarring among others.      Follow-up in prn pending biopsy results. Can schedule at her leisure for another PDL for rosacea/redness      Staff Involved:  Scribe/Staff      Scribe Disclosure:   I, Guerrero Walters, am serving as a scribe to document services personally performed by Dr. Pawel Obrien, based on data collection and the provider's statements to me.     Provider Disclosure:   I have reviewed the documentation recorded by the scribe and have edited it as needed. I have personally performed the services documented here and the documentation accurately represents those services and the decisions made by me.     Pawel Obrien MD, MS    Department of Dermatology  [facility]inics: Phone: [phone], Fax:[phone]  [facility]: Phone: [phone], Fax: [phone]             1/05/2017                                                                                                   3/13/2017

## 2017-03-13 NOTE — MR AVS SNAPSHOT
After Visit Summary   3/13/2017    Jannie Dexter    MRN: [id number]           Patient Information     Date Of Birth          1980        Visit Information        Provider Department      3/13/2017 11:15 AM Pawel Obrien MD [facility]        Today's Diagnoses     Rosacea    -  1    Neoplasm of uncertain behavior of skin of chest          Care Instructions    Wound Care After a Biopsy    What is a skin biopsy?  A skin biopsy allows the doctor to examine a very small piece of tissue under the microscope to determine the diagnosis and the best treatment for the skin condition. A local anesthetic (numbing medicine)  is injected with a very small needle into the skin area to be tested. A small piece of skin is taken from the area. Sometimes a suture (stitch) is used.     What are the risks of a skin biopsy?  I will experience scar, bleeding, swelling, pain, crusting and redness. I may experience incomplete removal or recurrence. Risks of this procedure are excessive bleeding, bruising, infection, nerve damage, numbness, thick (hypertrophic or keloidal) scar and non-diagnostic biopsy.    How should I care for my wound for the first 24 hours?    Keep the wound dry and covered for 24 hours    If it bleeds, hold direct pressure on the area for 15 minutes. If bleeding does not stop then go to the emergency room    Avoid strenuous exercise the first 1-2 days or as your doctor instructs you    How should I care for the wound after 24 hours?    After 24 hours, remove the bandage    You may bathe or shower as normal    If you had a scalp biopsy, you can shampoo as usual and can use shower water to clean the biopsy site daily    Clean the wound twice a day with gentle soap and water    Do not scrub, be gentle    Apply white petroleum/Vaseline after cleaning the wound with a cotton swab or a clean finger, and keep the site covered with a Bandaid /bandage. Bandages are not necessary with a  scalp biopsy    If you are unable to cover the site with a Bandaid /bandage, re-apply ointment 2-3 times a day to keep the site moist. Moisture will help with healing    Avoid strenuous activity for first 1-2 days    Avoid lakes, rivers, pools, and oceans until the stitches are removed or the site is healed    How do I clean my wound?    Wash hands for 15 minutes with soap or use hand  before all wound care    Clean the wound with gentle soap and water    Apply white petroleum/Vaseline  to wound after it is clean    Replace the Bandaid /bandage to keep the wound covered for the first few days or as instructed by your doctor    If you had a scalp biopsy, warm shower water to the area on a daily basis should suffice    What should I use to clean my wound?     Cotton-tipped applicators (Qtips )    White petroleum jelly (Vaseline ). Use a clean new container and use Q-tips to apply.    Bandaids   as needed    Gentle soap     How should I care for my wound long term?    Do not get your wound dirty    Keep up with wound care for one week or until the area is healed.    A small scab will form and fall off by itself when the area is completely healed. The area will be red and will become pink in color as it heals. Sun protection is very important for how your scar will turn out. Sunscreen with an SPF 30 or greater is recommended once the area is healed.    You should have some soreness but it should be mild and slowly go away over several days. Talk to your doctor about using tylenol for pain,    When should I call my doctor?  If you have increased:     Pain or swelling    Pus or drainage (clear or slightly yellow drainage is ok)    Temperature over 100F    Spreading redness or warmth around wound    When will I hear about my results?  The biopsy results can take 2-3 weeks to come back. The clinic will call you with the results, send you a [employer] message, or have you schedule a follow-up clinic or phone time to  discuss the results. Contact our clinics if you do not hear from us in 3 weeks.     Who should I call with questions?    [facility]: [phone]     [facility]: [phone]    For urgent needs outside of business hours call the [facility] at [phone] and ask for the dermatology resident on call          Follow-ups after your visit        Who to contact     If you have questions or need follow up information about today's clinic visit or your schedule please contact [facility] directly at [phone].  Normal or non-critical lab and imaging results will be communicated to you by MyChart, letter or phone within 4 business days after the clinic has received the results. If you do not hear from us within 7 days, please contact the clinic through [employer] or phone. If you have a critical or abnormal lab result, we will notify you by phone as soon as possible.  Submit refill requests through [employer] or call your pharmacy and they will forward the refill request to us. Please allow 3 business days for your refill to be completed.          Additional Information About Your Visit        MyChart Information     [employer] gives you secure access to your electronic health record. If you see a primary care provider, you can also send messages to your care team and make appointments. If you have questions, please call your primary care clinic.  If you do not have a primary care provider, please call [phone] and they will assist you.      [employer] is an electronic gateway that provides easy, online access to your medical records. With [employer], you can request a clinic appointment, read your test results, renew a prescription or communicate with your care team.     To access your existing account, please contact your [facility] or call [phone] for assistance.        Care EveryWhere ID     This is your  Care EveryWhere ID. This could be used by other organizations to access your [place] medical records  [id number]         Blood Pressure from Last 3 Encounters:   06/03/14 112/76   02/20/14 118/68   12/31/13 118/82    Weight from Last 3 Encounters:   06/03/14 57.1 kg (125 lb 14.4 oz)   02/20/14 56.7 kg (125 lb)   12/31/13 59.8 kg (131 lb 12.8 oz)              We Performed the Following     BIOPSY SKIN/SUBQ/MUC MEM, SINGLE LESION     Surgical pathology exam          Today's Medication Changes          These changes are accurate as of: 3/13/17  2:01 PM.  If you have any questions, ask your nurse or doctor.               Start taking these medicines.        Dose/Directions    lidocaine 1% with EPINEPHrine 1:100,000 1 %-1:287716 injection   Used for:  Neoplasm of uncertain behavior of skin of chest        Dose:  3 mL   Inject 3 mLs into the skin once for 1 dose   Quantity:  0.5 mL   Refills:  0            Where to get your medicines      Some of these will need a paper prescription and others can be bought over the counter.  Ask your nurse if you have questions.     You don't need a prescription for these medications     lidocaine 1% with EPINEPHrine 1:100,000 1 %-1:951358 injection                Primary Care Provider Office Phone # Fax #    [facility] [phone]       [facility] [address] [facility] [address]        Thank you!     Thank you for choosing [facility]  for your care. Our goal is always to provide you with excellent care. Hearing back from our patients is one way we can continue to improve our services. Please take a few minutes to complete the written survey that you may receive in the mail after your visit with us. Thank you!             Your Updated Medication List - Protect others around you: Learn how to safely use, store and throw away your medicines at www.disposemymeds.org.          This list is accurate as of: 3/13/17  2:01 PM.   Always use your most recent med list.                   Brand Name Dispense Instructions for use    albuterol 108 (90 BASE) MCG/ACT Inhaler    PROAIR HFA/PROVENTIL HFA/VENTOLIN HFA    1 Inhaler    Inhale 2 puffs into the lungs every 6 hours as needed for shortness of breath / dyspnea       cetirizine 10 MG tablet    zyrTEC    60 tablet    Take 1 tablet (10 mg) by mouth every evening       glycopyrrolate 1 MG tablet    ROBINUL    90 tablet    Take 1 tablet (1 mg) by mouth 3 times daily       lidocaine 1% with EPINEPHrine 1:100,000 1 %-1:625623 injection     0.5 mL    Inject 3 mLs into the skin once for 1 dose       spironolactone 100 MG tablet    ALDACTONE    30 tablet    TAKE ONE TABLET BY MOUTH ONE TIME DAILY       triamcinolone 0.1 % cream    KENALOG    15 g    Apply to affected area in axilla bid for 7-10 consecutive days, not to be used on face or groin

## 2017-03-13 NOTE — PATIENT INSTRUCTIONS

## 2017-03-13 NOTE — NURSING NOTE
Dermatology Rooming Note    Jannie Dexter's goals for this visit include:   Chief Complaint   Patient presents with     RECHECK     post pdl 4 weeks redness is better but not where she wants it yet        Is a scribe okay for this visit:YES    Are records needed for this visit(If yes, obtain release of information): No     Vitals: There were no vitals taken for this visit.    Referring Provider:  No referring provider defined for this encounter.

## 2017-03-15 LAB — COPATH REPORT: NORMAL

## 2017-03-18 NOTE — PROGRESS NOTES
Please call pt to report BCC and schedule with Dr. Crawford for simplex excision. Since location is on the Right breast, I am more comfortable with derm surg excision for better cosmesis. No need for mohs but Dr. Crawford can assess.    Pawel Obrien MD, MS    Department of Dermatology  [facility]: Phone: [phone], Fax:[phone]  [facility]: Phone: [phone], Fax: [phone]

## 2017-03-20 ENCOUNTER — TELEPHONE (OUTPATIENT)
Dept: DERMATOLOGY | Facility: CLINIC | Age: 37
End: 2017-03-20

## 2017-03-20 NOTE — TELEPHONE ENCOUNTER
[facility] Call Center    Phone Message    Name of Caller: Jannie    Phone Number: Home number on file [phone] (home)    Best time to return call: any    May a detailed message be left on voicemail: yes    Relation to patient: Self    Reason for Call: Other: needs clarification on labs from Dr Obrien.      Action Taken: Message routed to:  Adult Clinics: Dermatology p [id number]

## 2017-03-20 NOTE — TELEPHONE ENCOUNTER
[facility] Call Center    Phone Message    Name of Caller: Jannie    Phone Number: Home number on file [phone] (home)    Best time to return call: any    May a detailed message be left on voicemail: yes    Relation to patient: Self    Reason for Call: Other: see earlier notes of 3/20/17.      Action Taken: Message routed to:  Adult Clinics: Dermatology p [id number]

## 2017-03-21 NOTE — TELEPHONE ENCOUNTER
Writer spoke with patient who states that she received her results via my chart and she was anxious on what these results ment. Writer informed patient of results and excisional procedure. Patient leaves for [place] 6 weeks and questioning if she should have this procedure before or after. Writer informed patient we have an opening on Thursday and this would give her lots of time to heal prior to her trip. Patient scheduled with Dr. Crawford at 9:15 am for excision.       Notes Recorded by Pawel Obrien MD on 3/18/2017 at 12:35 PM  Please call pt to report BCC and schedule with Dr. Crawford for simplex excision. Since location is on the Right breast, I am more comfortable with derm surg excision for better cosmesis. No need for mohs but Dr. Crawford can assess.              Key points to address with patient in scheduling for excision:    Explain diagnosis: BCC    Explain excisional procedure: YES    Pressure dressing on for two days that can not get wet or come off: YES    After pressure dressing will have dressing change daily with Vaseline and dressing to cover suture line while sutures are in: YES    No activity for first 48 hours that raises blood pressure and avoid bending for first 48 hours if site is on face: YES    Avoid soaking in tub, swimming in pool or lake and hot tub while sutures are in place: YES    Ask patient to stop any blood thinning herbal medications such as fish oil, garlic supplement, st. Johns wart and vitamin E 2 weeks prior to surgery: Not applicable    Have patient check with ordering provider of Aspirin if patient should discontinue prior to procedure: Not applicable        Print off and put into folder:   *stickers   *consent   *pathology report             Lindy Hood LPN

## 2017-03-23 ENCOUNTER — OFFICE VISIT (OUTPATIENT)
Dept: DERMATOLOGY | Facility: CLINIC | Age: 37
End: 2017-03-23
Payer: COMMERCIAL

## 2017-03-23 VITALS — SYSTOLIC BLOOD PRESSURE: 118 MMHG | OXYGEN SATURATION: 100 % | DIASTOLIC BLOOD PRESSURE: 81 MMHG | HEART RATE: 89 BPM

## 2017-03-23 DIAGNOSIS — C44.519 BASAL CELL CARCINOMA OF ANTERIOR CHEST: Primary | ICD-10-CM

## 2017-03-23 PROCEDURE — 88305 TISSUE EXAM BY PATHOLOGIST: CPT | Performed by: DERMATOLOGY

## 2017-03-23 PROCEDURE — 11602 EXC TR-EXT MAL+MARG 1.1-2 CM: CPT | Performed by: DERMATOLOGY

## 2017-03-23 PROCEDURE — 12032 INTMD RPR S/A/T/EXT 2.6-7.5: CPT | Performed by: DERMATOLOGY

## 2017-03-23 NOTE — PROGRESS NOTES
DERMATOLOGY EXCISION PROCEDURE NOTE    Dermatology Problem List:  1. NMSC:  - BCC, R chest, s/p exc 3/23/17  2. Rosacea w/ Telangiectasia of face.  -s/p PDL 2/10/2017 (full face)  -Anticipated Next PDL settings --> decreasing purpura  Energy: 8 J/cm2 (Inc from 7)  Spot size:10mm  Pulse width: 10 mS (Inc from 6)  3. Frequent Tanning victoria use: will continue talking her out of it    NAME OF PROCEDURE: Excision intermediate layered linear closure  Staff surgeon: Dedra Crawford MD    PRE-OPERATIVE DIAGNOSIS:  Basal Cell Carcinoma  POST-OPERATIVE DIAGNOSIS: Same   FINAL EXCISION SIZE(DEFECT SIZE): 1.7 x 1.4 cm, with 4 mm margin   FINAL REPAIR LENGTH: 4.2 cm     INDICATIONS: This patient presented with a 0.9 x 0.6cm Basal Cell Carcinoma of the R chest. Excision was indicated. We discussed the principles of treatment and most likely complications including scarring, bleeding, infection, incomplete excision, wound dehiscence, pain, nerve damage, and recurrence. Informed consent was obtained and the patient underwent the procedure as follows:    PROCEDURE: The patient was taken to the operative suite. Time-out was performed.  The treatment area was anesthetized with % lidocaine and epinephrine mixed 1:2 with 0.5% marcaine. The area was prepped with Chlorhexidine and rinsed with sterile saline and draped with sterile towels. The lesion was delineated and excised down to subcutaneous fat in a fusiform manner. Hemostasis was obtained by electrocoagulation.     REPAIR: An intermediate layered linear closure was selected as the procedure which would maximally preserve both function and cosmesis.    After the excision of the tumor, the area was carefully undermined. Hemostasis was obtained with spot electrocoagulation.  Closure was oriented so that the wound was in the patient's natural skin tension lines. The subcutaneous and dermal layers were then closed with 4-0 vicryl sutures. The epidermis was then carefully approximated  along the length of the wound using 4-0 monocryl running subcuticular sutures.     The final wound length was 4.2 cm. A total of 6 ml of anesthesia was administered for all surgical sites. Estimated blood loss was less than 10 ml for all surgical sites. A sterile pressure dressing was applied and wound care instructions, with a written handout, were given. The patient was [facility] alert and ambulatory.      Dr. Crawford was immediately available for the entire surgery and was physicially present for the key portions of the procedure.    Anatomic Pathology Results: pending    Clinical Follow-Up: Next skin check in 6 months.    Staff Involved:  Staff Only    Dedra Crawford MD    Department of Dermatology  [facility]

## 2017-03-23 NOTE — LETTER
"    Patient:  Jannie Zee  :   1980  MRN:     [id number]        Ms.Michelle Zee  [address] [facility] [address]        2017    Dear ,    We are writing to inform you of your test results that show your skin cancer was removed completely with clear margins. If you have further questions or concerns, please contact the clinic at [phone].      Sincerely,    Trevor Crawford MD    Dermatology Department of Dermatology  [facility] [place], operated by [facility]    Resulted Orders   Surgical pathology exam   Result Value Ref Range    Copath Report       Patient Name: JANNIE ZEE  MR#: [id number]  Specimen #: [id number]  Collected: 3/23/2017  Received: 3/24/2017  Reported: 3/29/2017 10:34  Ordering Phy(s): TREVOR CRAWFORD    For improved result formatting, select 'View Enhanced Report Format'  under Linked Documents section.    SPECIMEN(S):  Skin, right breast    FINAL DIAGNOSIS:  Skin, breast, right:  - Ulcer and scar from the prior surgical procedure, with no evidence of  residual lesion - (see description)    I have personally reviewed all specimens and or slides, including the  listed special stains, and used them with my medical judgement to  determine the final diagnosis.    Electronically signed out by:    Ruben Hein M.D., [facility]    CLINICAL HISTORY:  The patient is a 36-year-old female.    GROSS:  The specimen is received in formalin with a proper patient's  identification, labeled \"skin, right breast\" and consists of a 2.7 x 1.2  x 0.6 cm unoriented ellipse of skin tissue fragment.  The skin surface  shows  a 0.6 x 0.5 cm slightly indented brown area. The resection margins  are inked in black.  The specimen is serially sectioned and entirely  submitted in three cassettes (cassette1 -tips). (Dictated by: Seema Dean 3/24/2017 11:13 AM)    MICROSCOPIC:  The specimen exhibits central ulceration above dermal " fibrosis and  lymphohistiocytic inflammation consistent with scar from the prior  surgical procedure.  No residual basal cell carcinoma is identified.    CPT Codes:  A: 98915-ZK7.T, 56815-YO2.P    TESTING LAB LOCATION:  [facility], [address]   [address] [phone]    COLLECTION SITE:  Client: [facility]  Location: [place])

## 2017-03-23 NOTE — NURSING NOTE
Steri strips and pressure dressing applied to excision site on right breast.  Wound care instructions reviewed with patient and AVS provided.  Patient verbalized understanding. No further questions or concerns at this time.    Lindy Hood LPN

## 2017-03-23 NOTE — NURSING NOTE
"Jannie Dexter's goals for this visit include: Excision right breast  She requests these members of her care team be copied on today's visit information:     PCP: Devi Valenzuela    Referring Provider:  No referring provider defined for this encounter.    Chief Complaint   Patient presents with     Procedure     Excision Right breast BCC       Initial /81  Pulse 89  SpO2 100% Estimated body mass index is 21.95 kg/(m^2) as calculated from the following:    Height as of 2/20/14: 1.613 m (5' 3.5\").    Weight as of 6/3/14: 57.1 kg (125 lb 14.4 oz).  Medication Reconciliation:     Do you need any medication refills at today's visit? No    .Lindy Hood LPN      "

## 2017-03-23 NOTE — MR AVS SNAPSHOT
After Visit Summary   3/23/2017    Jannie Dexter    MRN: [id number]           Patient Information     Date Of Birth          1980        Visit Information        Provider Department      3/23/2017 9:15 AM Dedra Crawford MD [facility]        Care Instructions    Excision Wound Care Instructions  I will experience scar, altered skin color, bleeding, swelling, pain, crusting and redness. I may experience altered sensation. Risks are excessive bleeding, infection, muscle weakness, thick (hypertrophic or keloidal) scar, and recurrence,. A second procedure may be recommended to obtain the best cosmetic or functional result.  Possible complications of any surgical procedure are bleeding, infection, scarring, alteration in skin color and sensation, muscle weakness in the area, wound dehiscence or seperation, or recurrence of the lesion or disease. On occasion, after healing, a secondary procedure or revision may be recommended in order to obtain the best cosmetic or functional result.   After your surgery, a pressure bandage will be placed over the area that has sutures. This will help prevent bleeding. Please follow these instructions as they will help you to prevent complications as your wound heals.  For the First 48 hours After Surgery:  1. Leave the pressure bandage on and keep it dry. If it should come loose, you may retape it, but do not take it off.  2. Relax and take it easy. Do not do any vigorous exercise, heavy lifting, or bending forward. This could cause the wound to bleed.  3. Post-operative pain is usually mild. You may take plain or extra strength Tylenol every 4 hours as needed (do not take more than 4,000mg in one day). Do not take any medicine that contains aspirin, ibuprofen or motrin unless you have been recommended these by a doctor.  Avoid alcohol and vitamin E as these may increase your tendency to bleed.  4. You may put an ice pack around the bandaged  area for 20 minutes every 2-3 hours. This may help reduce swelling, bruising, and pain. Make sure the ice pack is waterproof so that the pressure bandage does not get wet.   5. You may see a small amount of drainage or blood on your pressure bandage. This is normal. However, if drainage or bleeding continues or saturates the bandage, you will need to apply firm pressure over the bandage with a washcloth for 15 minutes. If bleeding continues after applying pressure for 15 minutes then go to the nearest emergency room.  48 Hours After Surgery  Carefully remove the bandage and start daily wound care and dressing changes. You may also now shower and get the wound wet. Wash wound with a mild soap and water.  Use caution when washing the wound. Be gentle and do not let the forceful shower stream hit the wound directly.  PAT dry.  Daily Wound Care:  1. Wash wound with a mild soap and water.  Use caution when washing the wound, be gentle and do not let the forceful shower stream hit the wound directly.  2. PAT DRY.  3. Once steri strips fall off apply Vaseline (from a new container or tube) over the suture line with a Q-tip. It is very important to keep the wound continuously moist, as wounds heal best in a moist environment.  4.  Keep the site covered for two weeks, you can cover it with a Telfa (non-stick) dressing and tape or a band-aid.    5. If you are unable to keep wound covered, you must apply Vaseline every 2 - 3 hours (while awake) after steri strips fall off to ensure it is being kept moist for optimal healing. A dressing overnight is recommended to keep the area moist.   Call Us If:  1. You have pain that is not controlled with Tylenol.  2. You have signs or symptoms of an infection, such as: fever over 100 degrees F, redness, warmth, or foul-smelling or yellow/creamy drainage from the wound.  Who should I call with questions?    [facility]: [phone]     [facility]: [phone]    For urgent needs outside of business hours call the [facility] at [phone] and ask for the dermatology resident on call            Follow-ups after your visit        Your next 10 appointments already scheduled     Dec 01, 2017  3:00 PM CST   Return Visit with Beryl Murphy MD   [facility]    [id number] [place] [address] [phone]              Who to contact     If you have questions or need follow up information about today's clinic visit or your schedule please contact [facility] directly at [phone].  Normal or non-critical lab and imaging results will be communicated to you by [employer], letter or phone within 4 business days after the clinic has received the results. If you do not hear from us within 7 days, please contact the clinic through [employer] or phone. If you have a critical or abnormal lab result, we will notify you by phone as soon as possible.  Submit refill requests through MediaPhy or call your pharmacy and they will forward the refill request to us. Please allow 3 business days for your refill to be completed.          Additional Information About Your Visit        [employer] Information     MediaP[employer] gives you secure access to your electronic health record. If you see a primary care provider, you can also send messages to your care team and make appointments. If you have questions, please call your primary care clinic.  If you do not have a primary care provider, please call [phone] and they will assist you.      MediaPhy is an electronic gateway that provides easy, online access to your medical records. With MediaPhy, you can request a clinic appointment, read your test results, renew a prescription or communicate with your care team.     To access your existing account, please contact your [facility] or call [phone] for  assistance.        Care EveryWhere ID     This is your Care EveryWhere ID. This could be used by other organizations to access your [place] medical records  [id number]        Your Vitals Were     Pulse Pulse Oximetry                89 100%           Blood Pressure from Last 3 Encounters:   03/23/17 118/81   06/03/14 112/76   02/20/14 118/68    Weight from Last 3 Encounters:   06/03/14 57.1 kg (125 lb 14.4 oz)   02/20/14 56.7 kg (125 lb)   12/31/13 59.8 kg (131 lb 12.8 oz)              Today, you had the following     No orders found for display       Primary Care Provider Office Phone # Fax #    TRAN Corral [phone]       [facility] [address] [facility] [address]        Thank you!     Thank you for choosing [facility]  for your care. Our goal is always to provide you with excellent care. Hearing back from our patients is one way we can continue to improve our services. Please take a few minutes to complete the written survey that you may receive in the mail after your visit with us. Thank you!             Your Updated Medication List - Protect others around you: Learn how to safely use, store and throw away your medicines at www.disposemymeds.org.          This list is accurate as of: 3/23/17 10:34 AM.  Always use your most recent med list.                   Brand Name Dispense Instructions for use    albuterol 108 (90 BASE) MCG/ACT Inhaler    PROAIR HFA/PROVENTIL HFA/VENTOLIN HFA    1 Inhaler    Inhale 2 puffs into the lungs every 6 hours as needed for shortness of breath / dyspnea       cetirizine 10 MG tablet    zyrTEC    60 tablet    Take 1 tablet (10 mg) by mouth every evening       spironolactone 100 MG tablet    ALDACTONE    30 tablet    TAKE ONE TABLET BY MOUTH ONE TIME DAILY

## 2017-03-23 NOTE — LETTER
3/23/2017      RE: Jannie Royallps  [address] [place] [address]       DERMATOLOGY EXCISION PROCEDURE NOTE    Dermatology Problem List:  1. NMSC:  - BCC, R chest, s/p exc 3/23/17  2. Rosacea w/ Telangiectasia of face.  -s/p PDL 2/10/2017 (full face)  -Anticipated Next PDL settings --> decreasing purpura  Energy: 8 J/cm2 (Inc from 7)  Spot size:10mm  Pulse width: 10 mS (Inc from 6)  3. Frequent Tanning victoria use: will continue talking her out of it    NAME OF PROCEDURE: Excision intermediate layered linear closure  Staff surgeon: Dedra Crawford MD    PRE-OPERATIVE DIAGNOSIS:  Basal Cell Carcinoma  POST-OPERATIVE DIAGNOSIS: Same   FINAL EXCISION SIZE(DEFECT SIZE): 1.7 x 1.4 cm, with 4 mm margin   FINAL REPAIR LENGTH: 4.2 cm     INDICATIONS: This patient presented with a 0.9 x 0.6cm Basal Cell Carcinoma of the R chest. Excision was indicated. We discussed the principles of treatment and most likely complications including scarring, bleeding, infection, incomplete excision, wound dehiscence, pain, nerve damage, and recurrence. Informed consent was obtained and the patient underwent the procedure as follows:    PROCEDURE: The patient was taken to the operative suite. Time-out was performed.  The treatment area was anesthetized with % lidocaine and epinephrine mixed 1:2 with 0.5% marcaine. The area was prepped with Chlorhexidine and rinsed with sterile saline and draped with sterile towels. The lesion was delineated and excised down to subcutaneous fat in a fusiform manner. Hemostasis was obtained by electrocoagulation.     REPAIR: An intermediate layered linear closure was selected as the procedure which would maximally preserve both function and cosmesis.    After the excision of the tumor, the area was carefully undermined. Hemostasis was obtained with spot electrocoagulation.  Closure was oriented so that the wound was in the patient's natural skin tension lines. The subcutaneous and dermal layers were  then closed with 4-0 vicryl sutures. The epidermis was then carefully approximated along the length of the wound using 4-0 monocryl running subcuticular sutures.     The final wound length was 4.2 cm. A total of 6 ml of anesthesia was administered for all surgical sites. Estimated blood loss was less than 10 ml for all surgical sites. A sterile pressure dressing was applied and wound care instructions, with a written handout, were given. The [facility] alert and ambulatory.      Dr. Crawford was immediately available for the entire surgery and was physicially present for the key portions of the procedure.    Anatomic Pathology Results: pending    Clinical Follow-Up: Next skin check in 6 months.    Staff Involved:  Staff Only    Dedra Crawford MD    Department of Dermatology  [facility]

## 2017-03-29 LAB — COPATH REPORT: NORMAL

## 2017-04-19 ENCOUNTER — OFFICE VISIT (OUTPATIENT)
Dept: FAMILY MEDICINE | Facility: CLINIC | Age: 37
End: 2017-04-19
Payer: COMMERCIAL

## 2017-04-19 DIAGNOSIS — L74.510 AXILLARY HYPERHIDROSIS: Primary | ICD-10-CM

## 2017-04-19 PROCEDURE — 64650 CHEMODENERV ECCRINE GLANDS: CPT | Performed by: FAMILY MEDICINE

## 2017-04-19 NOTE — PROGRESS NOTES
[facility] - PRIMARY CARE SKIN    CC : Excessive axillary sweating   SUBJECTIVE:                                                    Jannie Dexter is a 36 year old female who presents to clinic today for follow-up of excessive axillary sweating. She has had good control for the last 3 months with most recent treatment.    The patient has had injections previously with BOTOX.  Previous BOTOX injection treatments :   4/15/2016 - 100 units with excellent results.  8/12/2016 - 100 units with excellent results.  1/13/2017 - 100 units with excellent results.    They have had effective control of the sweating without any adverse side effects.  Previous ineffective treatments : OTC and prescription anti-perspirants.  Excessive sweating impact : Destroys room clothing, interference with work environment and social inhibition.    Occupation :  (indoor).    Refer to electronic medical record (EMR) for past medical history and medications.    ROS : 14 point review of systems was negative except the symptoms listed above in the HPI.    This document serves as a record of the services and decisions personally performed and made by Tabatha Madison MD. It was created on her behalf by Jackson Gresham, a trained medical scribe.  The creation of this document is based on the scribe's personal observations and the provider's statements to the medical scribe.  Jackson Gresham, April 19, 2017 11:57 AM      OBJECTIVE:                                                    GENERAL: healthy, alert and no distress  SKIN: Wong Skin Type - I.  Axillae were examined. The dermatoscope was used to help evaluate pigmented lesions.  Skin Pertinent Findings:  Axilla : Skin appears healthy. Area of excessive sweating was easily appreciated prior to the injection.    Diagnostic Test Results:  none     MDM : . Treatment options for control of the axillary sweating previously discussed.      ASSESSMENT:                                                       Encounter Diagnosis   Name Primary?     Axillary hyperhidrosis Yes     Comment : Hyperhidrosis, axillary - failed conservative treatment.      PLAN:                                                    Patient Instructions   HYPERHIDROSIS POST-TREATMENT CARE INSTRUCTIONS  Gently apply a cool compress or wrapped ice pack to the treated areas for 15 minutes every few hours as needed to reduce discomfort, swelling, or bruising up to a few days after treatment. If bruising should occur, it will typically resolve within 7-10 days.    Avoid these on the day of treatment:    Do not massage the treated areas.    Do not apply deodorant on the treated areas for 24 hours.    Avoid applying heat to the treated area.    Avoid activities such as hot tub/sauna use, exercise, tanning and other activities that can cause flushing.    Shave underarms and do not use over-the-counter deodorants or anti-perspirants for 24 hours prior to next treatment.    Botulinium toxin (BOTOX) treatment effects take 1-2 weeks to fully develop and last for approximately 6 months.    Signs of Infection:  Infection can occur in any area where skin has been disrupted.  If you notice persistent redness, swelling, colored drainage, increasing pain, fever or other signs of infection, please call us at: [phone] and ask to have me or my colleague paged. We will call you back to discuss.      Anticipate need for repeat injection in 4-6 months.  Expected treatment response in approximately 2 weeks  Patient is to contact us if there is any redness, fever, chills, headache arthralgias.      PROCEDURES:                                                    Name : Botulinum Toxin (BOTOX) injection.  Indication : axillary hyperhidrosis with previously failed conservative treatment.  Location(s) : left and right axillae.  Completed by : Tabatha Madison MD  Note : Discussed the risk of pain, infection, scarring, hypo- or hyperpigmentation and recurrence or  "need for re-treatment. Written pre- and post- treatment instructions were given to the patient and reviewed. The benefits and risks of treatment and alternative treatments were also discussed.    During this procedure, the universal protocol was utilized. The patient's identity was confirmed by no less than two patient identifiers, correct procedure was verified, correct site was verified and marked as applicable and a final pause was completed.    Sterile technique was used throughout the procedure. The skin was cleaned and prepped with surgical cleanser. Intradermal injections with BOTOX were done using a 32 gauge 1/2\" needle, creating a small wheal, spaced 1 cm apart and in a staggered fashion. No bleeding was present upon the completion of the procedure.    Botox : Lot: # [id number], Expiration date: Sep 2019.  Total units injected : 100 units.  Treatment number : 4.    Patient tolerated procedure well and left the room in satisfactory condition.      The information in this document, created by the medical scribe for me, accurately reflects the services I personally performed and the decisions made by me. I have reviewed and approved this document for accuracy prior to leaving the patient care area.  Tabatha Madison MD April 19, 2017 11:57 AM  [facility] - PRIMARY CARE SKIN  "

## 2017-04-19 NOTE — PATIENT INSTRUCTIONS
HYPERHIDROSIS POST-TREATMENT CARE INSTRUCTIONS  Gently apply a cool compress or wrapped ice pack to the treated areas for 15 minutes every few hours as needed to reduce discomfort, swelling, or bruising up to a few days after treatment. If bruising should occur, it will typically resolve within 7-10 days.    Avoid these on the day of treatment:    Do not massage the treated areas.    Do not apply deodorant on the treated areas for 24 hours.    Avoid applying heat to the treated area.    Avoid activities such as hot tub/sauna use, exercise, tanning and other activities that can cause flushing.    Shave underarms and do not use over-the-counter deodorants or anti-perspirants for 24 hours prior to next treatment.    Botulinium toxin (BOTOX) treatment effects take 1-2 weeks to fully develop and last for approximately 6 months.    Signs of Infection:  Infection can occur in any area where skin has been disrupted.  If you notice persistent redness, swelling, colored drainage, increasing pain, fever or other signs of infection, please call us at: [phone] and ask to have me or my colleague paged. We will call you back to discuss.

## 2017-04-19 NOTE — MR AVS SNAPSHOT
After Visit Summary   4/19/2017    Jannie Dexter    MRN: [id number]           Patient Information     Date Of Birth          1980        Visit Information        Provider Department      4/19/2017 12:00 PM Beatrice Madison MD [facility] - Primary Care Skin        Today's Diagnoses     Axillary hyperhidrosis    -  1      Care Instructions    HYPERHIDROSIS POST-TREATMENT CARE INSTRUCTIONS  Gently apply a cool compress or wrapped ice pack to the treated areas for 15 minutes every few hours as needed to reduce discomfort, swelling, or bruising up to a few days after treatment. If bruising should occur, it will typically resolve within 7-10 days.    Avoid these on the day of treatment:    Do not massage the treated areas.    Do not apply deodorant on the treated areas for 24 hours.    Avoid applying heat to the treated area.    Avoid activities such as hot tub/sauna use, exercise, tanning and other activities that can cause flushing.    Shave underarms and do not use over-the-counter deodorants or anti-perspirants for 24 hours prior to next treatment.    Botulinium toxin (BOTOX) treatment effects take 1-2 weeks to fully develop and last for approximately 6 months.    Signs of Infection:  Infection can occur in any area where skin has been disrupted.  If you notice persistent redness, swelling, colored drainage, increasing pain, fever or other signs of infection, please call us at: [phone] and ask to have me or my colleague paged. We will call you back to discuss.        Follow-ups after your visit        Your next 10 appointments already scheduled     Dec 01, 2017  3:00 PM CST   Return Visit with Beryl Murphy MD   [facility]    [address] [address] [place] [address] [phone]              Who to contact     If you have questions or need follow up information about today's clinic visit or your schedule please contact  [facility] PRIMARY CARE SKIN directly at [phone].  Normal or non-critical lab and imaging results will be communicated to you by MyChart, letter or phone within 4 business days after the clinic has received the results. If you do not hear from us within 7 days, please contact the clinic through MyChart or phone. If you have a critical or abnormal lab result, we will notify you by phone as soon as possible.  Submit refill requests through Workspot or call your pharmacy and they will forward the refill request to us. Please allow 3 business days for your refill to be completed.          Additional Information About Your Visit        [employer] Information     Workspot gives you secure access to your electronic health record. If you see a primary care provider, you can also send messages to your care team and make appointments. If you have questions, please call your primary care clinic.  If you do not have a primary care provider, please call [phone] and they will assist you.        Care EveryWhere ID     This is your Care EveryWhere ID. This could be used by other organizations to access your [place] medical records  [id number]         Blood Pressure from Last 3 Encounters:   03/23/17 118/81   06/03/14 112/76   02/20/14 118/68    Weight from Last 3 Encounters:   06/03/14 125 lb 14.4 oz (57.1 kg)   02/20/14 125 lb (56.7 kg)   12/31/13 131 lb 12.8 oz (59.8 kg)              Today, you had the following     No orders found for display       Primary Care Provider Office Phone # Fax #    TRAN Corral [facility] [phone]       [facility] [address] [facility] [address]        Thank you!     Thank you for choosing [facility]  for your care. Our goal is always to provide you with excellent care. Hearing back from our patients is one way we can continue to improve our services. Please take a few minutes to complete the written survey that you may  receive in the mail after your visit with us. Thank you!             Your Updated Medication List - Protect others around you: Learn how to safely use, store and throw away your medicines at www.disposemymeds.org.          This list is accurate as of: 4/19/17 12:01 PM.  Always use your most recent med list.                   Brand Name Dispense Instructions for use    albuterol 108 (90 BASE) MCG/ACT Inhaler    PROAIR HFA/PROVENTIL HFA/VENTOLIN HFA    1 Inhaler    Inhale 2 puffs into the lungs every 6 hours as needed for shortness of breath / dyspnea       cetirizine 10 MG tablet    zyrTEC    60 tablet    TAKE ONE TABLET BY MOUTH ONE TIME DAILY IN THE P.M.       spironolactone 100 MG tablet    ALDACTONE    30 tablet    TAKE ONE TABLET BY MOUTH ONE TIME DAILY

## 2017-04-24 ENCOUNTER — TELEPHONE (OUTPATIENT)
Dept: FAMILY MEDICINE | Facility: CLINIC | Age: 37
End: 2017-04-24

## 2017-04-24 DIAGNOSIS — J30.2 SEASONAL ALLERGIC RHINITIS, UNSPECIFIED ALLERGIC RHINITIS TRIGGER: Primary | ICD-10-CM

## 2017-04-24 RX ORDER — FLUTICASONE PROPIONATE 50 MCG
1-2 SPRAY, SUSPENSION (ML) NASAL DAILY
Qty: 1 BOTTLE | Refills: 11 | Status: SHIPPED | OUTPATIENT
Start: 2017-04-24

## 2017-04-24 NOTE — TELEPHONE ENCOUNTER
patient states that she gets her allergy medication prescribed by Dr. Madison and is really struggling with congested sinuses  Denies pain, fever, chills, coughing, rhinorrhea   Taking zyrtec with our relief  Would like a nasal spray prescribed- does not want to try and otc    Please advise,    Shalonda Medina RN  [facility]  [phone]

## 2017-04-24 NOTE — TELEPHONE ENCOUNTER
Patient notified of  PCP's message, no further questions.  Shalonda Medina RN  [facility]  [phone]

## 2017-09-21 DIAGNOSIS — L70.0 ACNE VULGARIS: ICD-10-CM

## 2017-09-21 NOTE — TELEPHONE ENCOUNTER
Routing refill request to provider for review/approval because:  A break in medication- patient should have been out of medication in 04/2017        patients last OV with Dr. Madison for DX of Acne Vulgaris:    [facility]     Subjective :  This is a 35 year old female who is here for followup for their acne.   Response to current treatment : her acne is doing a lot better with few breakouts when she is taking the 100 mg of spironolactone. She tried decreasing the dosage to 50 mg because she thought it was causing fatigue but it did not help the fatigue.  Current treatment :  Spironolactone 100mg a day  Problems with the treatment : she was feeling fatigue and tired ness, but now she is taking allergy medicine and it is not feeling as tired.       spironolactone      Last Written Prescription Date: 09/30/16  Last Fill Quantity: 30, # refills: 6  Last Office Visit with [facility], P or [facility] prescribing provider: 4/19/17  Next 5 appointments (look out 90 days)     Sep 27, 2017  4:20 PM CDT   Office Visit with Beatrice Madison MD   [facility] ([facility] )    [address] [place] [id number] [phone]            Dec 01, 2017 10:00 AM CST   [facility] with [facility]yl [facility]   [facility]    [address] [address] [place] [id number] [phone]                   Potassium   Date Value Ref Range Status   07/15/2015 4.3 3.4 - 5.3 mmol/L Final     Creatinine   Date Value Ref Range Status   07/15/2015 0.70 0.52 - 1.04 mg/dL Final     BP Readings from Last 3 Encounters:   03/23/17 118/81   06/03/14 112/76   02/20/14 118/68

## 2017-09-22 RX ORDER — SPIRONOLACTONE 100 MG/1
TABLET, FILM COATED ORAL
Qty: 30 TABLET | Refills: 6 | Status: SHIPPED | OUTPATIENT
Start: 2017-09-22 | End: 2018-06-08

## 2017-09-27 ENCOUNTER — OFFICE VISIT (OUTPATIENT)
Dept: FAMILY MEDICINE | Facility: CLINIC | Age: 37
End: 2017-09-27
Payer: COMMERCIAL

## 2017-09-27 DIAGNOSIS — L74.510 AXILLARY HYPERHIDROSIS: Primary | ICD-10-CM

## 2017-09-27 DIAGNOSIS — Z87.2 HISTORY OF ROSACEA: ICD-10-CM

## 2017-09-27 DIAGNOSIS — L91.8 INFLAMED SKIN TAG: ICD-10-CM

## 2017-09-27 DIAGNOSIS — L70.0 ACNE VULGARIS: ICD-10-CM

## 2017-09-27 PROCEDURE — 99213 OFFICE O/P EST LOW 20 MIN: CPT | Mod: 25 | Performed by: FAMILY MEDICINE

## 2017-09-27 PROCEDURE — 64650 CHEMODENERV ECCRINE GLANDS: CPT | Mod: 51 | Performed by: FAMILY MEDICINE

## 2017-09-27 PROCEDURE — 11200 RMVL SKIN TAGS UP TO&INC 15: CPT | Performed by: FAMILY MEDICINE

## 2017-09-27 NOTE — MR AVS SNAPSHOT
After Visit Summary   9/27/2017    Jannie Dexter    MRN: [id number]           Patient Information     Date Of Birth          1980        Visit Information        Provider Department      9/27/2017 4:20 PM Beatrice Madison MD [facility]        Today's Diagnoses     Axillary hyperhidrosis    -  1    Inflamed skin tag        Acne vulgaris        History of rosacea          Care Instructions    FUTURE APPOINTMENTS  Follow up as needed in approximately 4-6 months.    Consider following with Kelly Stewart MD at [facility].  [phone]  Offices in [facility]    HYPERHIDROSIS POST-TREATMENT CARE INSTRUCTIONS  Gently apply a cool compress or wrapped ice pack to the treated areas for 15 minutes every few hours as needed to reduce discomfort, swelling, or bruising up to a few days after treatment. If bruising should occur, it will typically resolve within 7-10 days.    Avoid these on the day of treatment:    Do not massage the treated areas.    Do not apply deodorant on the treated areas for 24 hours.    Avoid applying heat to the treated area.    Avoid activities such as hot tub/sauna use, exercise, tanning and other activities that can cause flushing.    Shave underarms and do not use over-the-counter deodorants or anti-perspirants for 24 hours prior to next treatment.    Botulinium toxin (BOTOX) treatment effects take 1-2 weeks to fully develop and last for approximately 6 months.    Signs of Infection:  Infection can occur in any area where skin has been disrupted.  If you notice persistent redness, swelling, colored drainage, increasing pain, fever or other signs of infection, please call us at: [phone] and ask to have me or my colleague paged. We will call you back to discuss.    CRYOTHERAPY POST-TREATMENT CARE INSTRUCTIONS  Liquid nitrogen is mildly uncomfortable when applied to the skin, but the discomfort rapidly  subsides.    Post-Treatment:  You may experience burning and/or stinging immediately following the procedure. The discomfort from the procedure may persist over the next 12-24 hours. The area treated will look pinker and slightly swollen before the healing process begins. You may also notice redness, swelling, tenderness, weeping and crusts or scabs. Healing time is approximately 10-14 days.    Blister - You may or may notice blistering from the freezing. If you develop an uncomfortable blister from today's treatment, you may gently puncture this with a needle that has been cleaned with alcohol. However, do not remove the protective skin layer of the blister.    Scab - After a few days, you may notice scaliness or scab formation. Do not pick at the scabs because this may cause slower healing and a permanent scar.    The skin may appear temporarily darker at the treatment site, but this usually fades over a period of months, provided that the area is protected from the sun.    Care of the areas treated:    Wash the area with a mild cleanser.    Gently pat dry.    Do not rub.     Keep protected from the sun during the healing process and for a full year following treatment as the skin continues to remodel during this time.    If you experience dryness or persistent burning, you may use Vaseline or Aquaphor ointment sparingly.    Do not use Neosporin, as many people eventually develop a medication allergy, that can easily be confused with an infection, to Neomycin.    Return if:  If there is any concern that the lesion has persisted, make an appointment for a re-check. Healing time does vary depending on your individual healing process and the area of the body treated. Most patients will be healed in one month.    Signs of Infection:  Thankfully this is rare. However if you notice persistent colored drainage, increasing pain, fever or other signs of infection, please call us at: [phone]    ORAL ISOTRETINOIN  "INFORMATION  Check with your insurance for coverage of oral isotretinoin therapy for \"acne vulgaris recalcitrant to oral and topical antibiotics, topical tretinoins.\" Please let us know if there is a preferred brand of oral isotretinoin. Consider also comparing prices on [employer]    Oral isotretinoin (\"Accutane\") is a very effective drug to treat acne vulgaris but has many significant side effects. Chief among these are teratogenesis (malformations in embryo or fetus), hepatic (liver) injury, dyslipidemia (elevated fat in blood) and severe drying of the mucous membranes. Therefore, it is necessary to have monthly blood tests to monitor lipids and liver function.    Expect painful dryness and/or fissuring around the lips, eyes, and other moist areas of the body.    Balms may be protective.    Contact lens may be too painful to wear temporarily while on this drug.    Episodes of significant depression have been reported, including suicidal ideation and attempts in rare cases.    It may also cause pseudotumor cerebri and hyperostosis.    Please report any such changes in mood, depressive symptoms or suicidal thoughts, headaches, joint or bone pains.    Make sure you maintain good dental hygiene while on the oral isotretinoin. Regular flossing and brushing teeth twice per day    Female patients MUST use two simultaneous methods of family planning. Accutane is classified Category X for pregnancy, meaning it will cause fetal teratogenic malformations, and pregnancy MUST be avoided while on this drug. Therefore the patient is counseled to refrain from donating blood while on Accutane and for one month after the last pill.          Follow-ups after your visit        Your next 10 appointments already scheduled     Dec 01, 2017 10:00 AM CST   Return Visit with Beryl Murphy MD   [facility] ([facility])    [address] [address] [place] [id number] [phone]              Who to " contact     If you have questions or need follow up information about today's clinic visit or your schedule please contact [facility] directly at [phone].  Normal or non-critical lab and imaging results will be communicated to you by MyChart, letter or phone within 4 business days after the clinic has received the results. If you do not hear from us within 7 days, please contact the clinic through [employer] or phone. If you have a critical or abnormal lab result, we will notify you by phone as soon as possible.  Submit refill requests through [employer] or call your pharmacy and they will forward the refill request to us. Please allow 3 business days for your refill to be completed.          Additional Information About Your Visit        [employer] Information     [employer] gives you secure access to your electronic health record. If you see a primary care provider, you can also send messages to your care team and make appointments. If you have questions, please call your primary care clinic.  If you do not have a primary care provider, please call [phone] and they will assist you.        Care EveryWhere ID     This is your Care EveryWhere ID. This could be used by other organizations to access your [facility] medical records  [id number]         Blood Pressure from Last 3 Encounters:   03/23/17 118/81   06/03/14 112/76   02/20/14 118/68    Weight from Last 3 Encounters:   06/03/14 125 lb 14.4 oz (57.1 kg)   02/20/14 125 lb (56.7 kg)   12/31/13 131 lb 12.8 oz (59.8 kg)              Today, you had the following     No orders found for display       Primary Care Provider Office Phone # Fax #    TRAN Corral [facility] [phone] [address] [facility] [address]        Equal Access to Services     TALAT MOLINA : Javon Palafox, joanne downey, felicia freire, orlando gabriel. So [facility] [phone].    ATENCIÓN: Si  mary campos, tiene a duarte disposición servicios gratuitos de asistencia lingüística. Beth edwards [phone].    We comply with applicable federal civil rights laws and [place] laws. We do not discriminate on the basis of race, color, national origin, age, disability sex, sexual orientation or gender identity.            Thank you!     Thank you for choosing [facility]  for your care. Our goal is always to provide you with excellent care. Hearing back from our patients is one way we can continue to improve our services. Please take a few minutes to complete the written survey that you may receive in the mail after your visit with us. Thank you!             Your Updated Medication List - Protect others around you: Learn how to safely use, store and throw away your medicines at www.disposemymeds.org.          This list is accurate as of: 9/27/17  4:20 PM.  Always use your most recent med list.                   Brand Name Dispense Instructions for use Diagnosis    albuterol 108 (90 BASE) MCG/ACT Inhaler    PROAIR HFA/PROVENTIL HFA/VENTOLIN HFA    1 Inhaler    Inhale 2 puffs into the lungs every 6 hours as needed for shortness of breath / dyspnea    Exercise-induced asthma       cetirizine 10 MG tablet    zyrTEC    60 tablet    TAKE ONE TABLET BY MOUTH ONE TIME DAILY IN THE P.M.    Atopic dermatitis, unspecified type       fluticasone 50 MCG/ACT spray    FLONASE    1 Bottle    Spray 1-2 sprays into both nostrils daily    Seasonal allergic rhinitis, unspecified allergic rhinitis trigger       spironolactone 100 MG tablet    ALDACTONE    30 tablet    TAKE ONE TABLET BY MOUTH ONE TIME DAILY    Acne vulgaris

## 2017-09-27 NOTE — PATIENT INSTRUCTIONS
FUTURE APPOINTMENTS  Follow up as needed in approximately 4-6 months.    Consider following with Kelly Stewart MD at [facility].  [phone]  Offices in [facility]    HYPERHIDROSIS POST-TREATMENT CARE INSTRUCTIONS  Gently apply a cool compress or wrapped ice pack to the treated areas for 15 minutes every few hours as needed to reduce discomfort, swelling, or bruising up to a few days after treatment. If bruising should occur, it will typically resolve within 7-10 days.    Avoid these on the day of treatment:    Do not massage the treated areas.    Do not apply deodorant on the treated areas for 24 hours.    Avoid applying heat to the treated area.    Avoid activities such as hot tub/sauna use, exercise, tanning and other activities that can cause flushing.    Shave underarms and do not use over-the-counter deodorants or anti-perspirants for 24 hours prior to next treatment.    Botulinium toxin (BOTOX) treatment effects take 1-2 weeks to fully develop and last for approximately 6 months.    Signs of Infection:  Infection can occur in any area where skin has been disrupted.  If you notice persistent redness, swelling, colored drainage, increasing pain, fever or other signs of infection, please call us at: [phone] and ask to have me or my colleague paged. We will call you back to discuss.    CRYOTHERAPY POST-TREATMENT CARE INSTRUCTIONS  Liquid nitrogen is mildly uncomfortable when applied to the skin, but the discomfort rapidly subsides.    Post-Treatment:  You may experience burning and/or stinging immediately following the procedure. The discomfort from the procedure may persist over the next 12-24 hours. The area treated will look pinker and slightly swollen before the healing process begins. You may also notice redness, swelling, tenderness, weeping and crusts or scabs. Healing time is approximately 10-14 days.    Blister - You may or may notice blistering from the freezing.  "If you develop an uncomfortable blister from today's treatment, you may gently puncture this with a needle that has been cleaned with alcohol. However, do not remove the protective skin layer of the blister.    Scab - After a few days, you may notice scaliness or scab formation. Do not pick at the scabs because this may cause slower healing and a permanent scar.    The skin may appear temporarily darker at the treatment site, but this usually fades over a period of months, provided that the area is protected from the sun.    Care of the areas treated:    Wash the area with a mild cleanser.    Gently pat dry.    Do not rub.     Keep protected from the sun during the healing process and for a full year following treatment as the skin continues to remodel during this time.    If you experience dryness or persistent burning, you may use Vaseline or Aquaphor ointment sparingly.    Do not use Neosporin, as many people eventually develop a medication allergy, that can easily be confused with an infection, to Neomycin.    Return if:  If there is any concern that the lesion has persisted, make an appointment for a re-check. Healing time does vary depending on your individual healing process and the area of the body treated. Most patients will be healed in one month.    Signs of Infection:  Thankfully this is rare. However if you notice persistent colored drainage, increasing pain, fever or other signs of infection, please call us at: [phone]    ORAL ISOTRETINOIN INFORMATION  Check with your insurance for coverage of oral isotretinoin therapy for \"acne vulgaris recalcitrant to oral and topical antibiotics, topical tretinoins.\" Please let us know if there is a preferred brand of oral isotretinoin. Consider also comparing prices on [employer]    Oral isotretinoin (\"Accutane\") is a very effective drug to treat acne vulgaris but has many significant side effects. Chief among these are teratogenesis (malformations in embryo " or fetus), hepatic (liver) injury, dyslipidemia (elevated fat in blood) and severe drying of the mucous membranes. Therefore, it is necessary to have monthly blood tests to monitor lipids and liver function.    Expect painful dryness and/or fissuring around the lips, eyes, and other moist areas of the body.    Balms may be protective.    Contact lens may be too painful to wear temporarily while on this drug.    Episodes of significant depression have been reported, including suicidal ideation and attempts in rare cases.    It may also cause pseudotumor cerebri and hyperostosis.    Please report any such changes in mood, depressive symptoms or suicidal thoughts, headaches, joint or bone pains.    Make sure you maintain good dental hygiene while on the oral isotretinoin. Regular flossing and brushing teeth twice per day    Female patients MUST use two simultaneous methods of family planning. Accutane is classified Category X for pregnancy, meaning it will cause fetal teratogenic malformations, and pregnancy MUST be avoided while on this drug. Therefore the patient is counseled to refrain from donating blood while on Accutane and for one month after the last pill.

## 2017-09-27 NOTE — PROGRESS NOTES
[facility] - PRIMARY CARE SKIN    CC : Excessive axillary sweating   SUBJECTIVE:                                                    Jannie Dexter is a 37 year old female who presents to clinic today for follow-up of excessive axillary sweating. She has had good control for the last 5 months with most recent treatment.    The patient has had injections previously with BOTOX.  Previous BOTOX injection treatments :   4/15/2016 - 100 units with excellent results.  8/12/2016 - 100 units with excellent results.  1/13/2017 - 100 units with excellent results.  4/19/2017 - 100 units with excellent results.    They have had effective control of the sweating without any adverse side effects.  Previous ineffective treatments : OTC and prescription anti-perspirants.  Excessive sweating impact : Destroys room clothing, interference with work environment and social inhibition.    Issue Two : She also complains of a skin tag on the left armpit.    Issue Three : Spironolactone 100 mg once daily is no longer providing satisfactory control of acne. Dryness has now been an issue.    she has tried the following without success : topical tretinoins and ocp, spironolactone.  Contraception : 1) IUD Mirena, 2) condoms    Issue Four : Metrogel has previously been ineffective for control of rosacea. She is now pursuing laser treatment.    Occupation :  (indoor).    Refer to electronic medical record (EMR) for past medical history and medications.    I:POSITIVE for changing lesion, acne, redness  E:POSITIVE for excessive sweating  ROS : 14 point review of systems was negative except the symptoms listed above in the HPI.    This document serves as a record of the services and decisions personally performed and made by Tabatha Madison MD. It was created on her behalf by Jackson Gresham, a trained medical scribe.  The creation of this document is based on the scribe's personal observations and the provider's statements to the medical  zana.  Jackson Gresham, September 27, 2017 4:03 PM      OBJECTIVE:                                                    GENERAL: healthy, alert and no distress  SKIN: Wong Skin Type - I.  Axillae were examined. The dermatoscope was used to help evaluate pigmented lesions.  Skin Pertinent Findings:  Axilla : Skin appears healthy. Area of excessive sweating was easily appreciated prior to the injection.    Face : few inflammatory papules      Left anterior upper arm : 2 mm in size, brown, pedunculated, benign-appearing skin tag(s).  Name : Lesion Removal  Indication : Irritated/inflamed benign skin tag.  Completed by : Tabatha Madison MD  Note : Prior to treatment, discussed the risk of pain, blistering, infection, scarring, hypopigmentation, hyperpigmentation, and recurrence or need for retreatment. Benefits of treatment and alternative treatments were also discussed.     The lesion(s) were removed or treated with liquid nitrogen via cryactweezers after alcohol prep    Patient tolerated the procedure well and left in good condition.  Tissue sample sent in : No.  Total number of lesions treated : 1.    Diagnostic Test Results:  none     MDM : . Treatment options for control of the axillary sweating previously discussed.  Side effects of oral isotretinoin for acne reviewed : dryness of the skin and mucous membranes, arthralgias, myalgias, mood changes. Discussed potential flare of the acne. She is interested in the oral isotretinoin but will wait until finished laser treatments for rosacea      ASSESSMENT:                                                      Encounter Diagnoses   Name Primary?     Axillary hyperhidrosis Yes     Inflamed skin tag      Acne vulgaris      History of rosacea      Comment : Hyperhidrosis, axillary - failed conservative treatment.      PLAN:                                                    Patient Instructions   FUTURE APPOINTMENTS  Follow up as needed in approximately 4-6  months.    HYPERHIDROSIS POST-TREATMENT CARE INSTRUCTIONS  Gently apply a cool compress or wrapped ice pack to the treated areas for 15 minutes every few hours as needed to reduce discomfort, swelling, or bruising up to a few days after treatment. If bruising should occur, it will typically resolve within 7-10 days.    Avoid these on the day of treatment:    Do not massage the treated areas.    Do not apply deodorant on the treated areas for 24 hours.    Avoid applying heat to the treated area.    Avoid activities such as hot tub/sauna use, exercise, tanning and other activities that can cause flushing.    Shave underarms and do not use over-the-counter deodorants or anti-perspirants for 24 hours prior to next treatment.    Botulinium toxin (BOTOX) treatment effects take 1-2 weeks to fully develop and last for approximately 6 months.    Signs of Infection:  Infection can occur in any area where skin has been disrupted.  If you notice persistent redness, swelling, colored drainage, increasing pain, fever or other signs of infection, please call us at: [phone] and ask to have me or my colleague paged. We will call you back to discuss.    CRYOTHERAPY POST-TREATMENT CARE INSTRUCTIONS  Liquid nitrogen is mildly uncomfortable when applied to the skin, but the discomfort rapidly subsides.    Post-Treatment:  You may experience burning and/or stinging immediately following the procedure. The discomfort from the procedure may persist over the next 12-24 hours. The area treated will look pinker and slightly swollen before the healing process begins. You may also notice redness, swelling, tenderness, weeping and crusts or scabs. Healing time is approximately 10-14 days.    Blister - You may or may notice blistering from the freezing. If you develop an uncomfortable blister from today's treatment, you may gently puncture this with a needle that has been cleaned with alcohol. However, do not remove the protective skin  "layer of the blister.    Scab - After a few days, you may notice scaliness or scab formation. Do not pick at the scabs because this may cause slower healing and a permanent scar.    The skin may appear temporarily darker at the treatment site, but this usually fades over a period of months, provided that the area is protected from the sun.    Care of the areas treated:    Wash the area with a mild cleanser.    Gently pat dry.    Do not rub.     Keep protected from the sun during the healing process and for a full year following treatment as the skin continues to remodel during this time.    If you experience dryness or persistent burning, you may use Vaseline or Aquaphor ointment sparingly.    Do not use Neosporin, as many people eventually develop a medication allergy, that can easily be confused with an infection, to Neomycin.    Return if:  If there is any concern that the lesion has persisted, make an appointment for a re-check. Healing time does vary depending on your individual healing process and the area of the body treated. Most patients will be healed in one month.    Signs of Infection:  Thankfully this is rare. However if you notice persistent colored drainage, increasing pain, fever or other signs of infection, please call us at: [phone]    ORAL ISOTRETINOIN INFORMATION  Check with your insurance for coverage of oral isotretinoin therapy for \"acne vulgaris recalcitrant to oral and topical antibiotics, topical tretinoins.\" Please let us know if there is a preferred brand of oral isotretinoin. Consider also comparing prices on [employer]    Oral isotretinoin (\"Accutane\") is a very effective drug to treat acne vulgaris but has many significant side effects. Chief among these are teratogenesis (malformations in embryo or fetus), hepatic (liver) injury, dyslipidemia (elevated fat in blood) and severe drying of the mucous membranes. Therefore, it is necessary to have monthly blood tests to monitor lipids " and liver function.    Expect painful dryness and/or fissuring around the lips, eyes, and other moist areas of the body.    Balms may be protective.    Contact lens may be too painful to wear temporarily while on this drug.    Episodes of significant depression have been reported, including suicidal ideation and attempts in rare cases.    It may also cause pseudotumor cerebri and hyperostosis.    Please report any such changes in mood, depressive symptoms or suicidal thoughts, headaches, joint or bone pains.    Make sure you maintain good dental hygiene while on the oral isotretinoin. Regular flossing and brushing teeth twice per day    Female patients MUST use two simultaneous methods of family planning. Accutane is classified Category X for pregnancy, meaning it will cause fetal teratogenic malformations, and pregnancy MUST be avoided while on this drug. Therefore the patient is counseled to refrain from donating blood while on Accutane and for one month after the last pill.      Anticipate need for repeat injection in 4-6 months.  Expected treatment response in approximately 2 weeks  Patient is to contact us if there is any redness, fever, chills, headache arthralgias.      Oral isotretinoin is discussed fully with the patient.    It is a very effective drug to treat acne vulgaris but has many significant side effects. Chief among these are teratogenesis, hepatic injury, dyslipidemia and severe drying of the mucous membranes. All of these issues have been discussed in detail. Monthly blood tests to monitor lipids and liver functions will be necessary. Expect painful dryness and/or fissuring around the lips, eyes, and other moist areas of the body. Balms may be protective. Contact lenses may be too painful to wear temporarily while on this drug. Episodes of significant depression have been reported, including suicidal ideation and attempts in rare cases. It may also cause pseudotumor cerebri and hyperostosis. The  "patient will report any such changes in mood, depressive symptoms or suicidal thoughts, headaches, joint or bone pains.    Female patients MUST use two simultaneous methods of family planning. Accutane is Category X for pregnancy, meaning it will cause fetal teratogenic malformations, and pregnancy MUST be avoided while on this drug. For that reason, the patient is admonished to never share the medication.    The dose is 0.5-1 mg/kg in two divided doses for 15-20 weeks.    After discussion of these important issues, she indicates complete understanding of all of the above, and Does not wish to proceed with accutane therapy. A signed consent was obtained. Discussed the importance of sticking with the program, not picking or excoriating.        PROCEDURES:                                                    Name : Botulinum Toxin (BOTOX) injection.  Indication : axillary hyperhidrosis with previously failed conservative treatment.  Location(s) : left and right axillae.  Completed by : Tabatha Madison MD  Note : Discussed the risk of pain, infection, scarring, hypo- or hyperpigmentation and recurrence or need for re-treatment. Written pre- and post- treatment instructions were given to the patient and reviewed. The benefits and risks of treatment and alternative treatments were also discussed.    During this procedure, the universal protocol was utilized. The patient's identity was confirmed by no less than two patient identifiers, correct procedure was verified, correct site was verified and marked as applicable and a final pause was completed.    Sterile technique was used throughout the procedure. The skin was cleaned and prepped with surgical cleanser. Intradermal injections with BOTOX were done using a 32 gauge 1/2\" needle, creating a small wheal, spaced 1 cm apart and in a staggered fashion. No bleeding was present upon the completion of the procedure.    Botox : Lot: #[id number], Expiration date: Dec 2019.  Total " units injected : 100 units.  Treatment number : 5.    Patient tolerated procedure well and left the room in satisfactory condition.      The information in this document, created by the medical scribe for me, accurately reflects the services I personally performed and the decisions made by me. I have reviewed and approved this document for accuracy prior to leaving the patient care area.  Tabatha Madison MD September 27, 2017 4:03 PM  [facility] - PRIMARY CARE SKIN

## 2018-04-29 DIAGNOSIS — L20.9 ATOPIC DERMATITIS, UNSPECIFIED TYPE: ICD-10-CM

## 2018-04-30 RX ORDER — CETIRIZINE HYDROCHLORIDE 10 MG/1
TABLET ORAL
Qty: 60 TABLET | Refills: 4 | Status: SHIPPED | OUTPATIENT
Start: 2018-04-30 | End: 2019-02-18

## 2018-04-30 NOTE — TELEPHONE ENCOUNTER
"Requested Prescriptions   Pending Prescriptions Disp Refills     cetirizine (ZYRTEC) 10 MG tablet [Pharmacy Med Name: CETIRIZINE HCL 10 MG TABLET] 60 tablet 8     Sig: TAKE ONE TABLET BY MOUTH ONE TIME DAILY IN THE P.M.    Antihistamines Protocol Passed    4/29/2018  6:02 AM       Passed - Patient is 3-64 years of age    Apply weight-based dosing for peds patients age 3 - 12 years of age.    Forward request to provider for patients under the age of 3 or over the age of 64.         Passed - Recent (12 mo) or future (30 days) visit within the authorizing provider's specialty    Patient had office visit in the last 12 months or has a visit in the next 30 days with authorizing provider or within the authorizing provider's specialty.  See \"Patient Info\" tab in inbasket, or \"Choose Columns\" in Meds & Orders section of the refill encounter.            Last Written Prescription Date:  4/10/2017  Last Fill Quantity: 60,  # refills: 11   Last office visit: 9/27/2017 with prescribing provider:  Beatrice Madison MD    Future Office Visit:  VINCENT Vargas MA        "

## 2018-06-08 DIAGNOSIS — L70.0 ACNE VULGARIS: ICD-10-CM

## 2018-06-08 NOTE — TELEPHONE ENCOUNTER
"Last Written Prescription Date:  09/22/17  Last Fill Quantity: 30,  # refills: 6   Last office visit: 9/27/2017 with prescribing provider:   09/2017  Future Office Visit:            Requested Prescriptions   Pending Prescriptions Disp Refills     spironolactone (ALDACTONE) 100 MG tablet [Pharmacy Med Name: SPIRONOLACTONE 100 MG TABLET] 30 tablet 6     Sig: TAKE ONE TABLET BY MOUTH ONE TIME DAILY    Diuretics (Including Combos) Protocol Failed    6/8/2018  3:25 AM       Failed - Blood pressure under 140/90 in past 12 months    BP Readings from Last 3 Encounters:   03/23/17 118/81   06/03/14 112/76   02/20/14 118/68                Failed - Normal serum creatinine on file in past 12 months    Recent Labs   Lab Test  07/15/15   1139   CR  0.70             Failed - Normal serum potassium on file in past 12 months    Recent Labs   Lab Test  07/15/15   1139   POTASSIUM  4.3                   Failed - Normal serum sodium on file in past 12 months    Recent Labs   Lab Test  12/31/13   1030   NA  137             Passed - Recent (12 mo) or future (30 days) visit within the authorizing provider's specialty    Patient had office visit in the last 12 months or has a visit in the next 30 days with authorizing provider or within the authorizing provider's specialty.  See \"Patient Info\" tab in inbasket, or \"Choose Columns\" in Meds & Orders section of the refill encounter.           Passed - Patient is age 18 or older       Passed - No active pregancy on record       Passed - No positive pregnancy test in past 12 months          "

## 2018-06-11 RX ORDER — SPIRONOLACTONE 100 MG/1
TABLET, FILM COATED ORAL
Qty: 30 TABLET | Refills: 0 | Status: SHIPPED | OUTPATIENT
Start: 2018-06-11 | End: 2018-08-01

## 2018-08-01 ENCOUNTER — OFFICE VISIT (OUTPATIENT)
Dept: FAMILY MEDICINE | Facility: CLINIC | Age: 38
End: 2018-08-01
Payer: COMMERCIAL

## 2018-08-01 VITALS — DIASTOLIC BLOOD PRESSURE: 78 MMHG | OXYGEN SATURATION: 98 % | SYSTOLIC BLOOD PRESSURE: 111 MMHG | HEART RATE: 93 BPM

## 2018-08-01 DIAGNOSIS — L70.0 ACNE VULGARIS: Primary | ICD-10-CM

## 2018-08-01 DIAGNOSIS — L30.9 ECZEMA, UNSPECIFIED TYPE: ICD-10-CM

## 2018-08-01 DIAGNOSIS — I78.1 CAPILLARY HEMANGIOMA: ICD-10-CM

## 2018-08-01 PROCEDURE — 36415 COLL VENOUS BLD VENIPUNCTURE: CPT | Performed by: FAMILY MEDICINE

## 2018-08-01 PROCEDURE — 82565 ASSAY OF CREATININE: CPT | Performed by: FAMILY MEDICINE

## 2018-08-01 PROCEDURE — 17999 UNLISTD PX SKN MUC MEMB SUBQ: CPT | Performed by: FAMILY MEDICINE

## 2018-08-01 PROCEDURE — 84132 ASSAY OF SERUM POTASSIUM: CPT | Performed by: FAMILY MEDICINE

## 2018-08-01 PROCEDURE — 99213 OFFICE O/P EST LOW 20 MIN: CPT | Mod: 25 | Performed by: FAMILY MEDICINE

## 2018-08-01 RX ORDER — TRIAMCINOLONE ACETONIDE 1 MG/G
CREAM TOPICAL
Qty: 15 G | Refills: 1 | Status: SHIPPED | OUTPATIENT
Start: 2018-08-01

## 2018-08-01 RX ORDER — SPIRONOLACTONE 100 MG/1
100 TABLET, FILM COATED ORAL DAILY
Qty: 90 TABLET | Refills: 3 | Status: SHIPPED | OUTPATIENT
Start: 2018-08-01

## 2018-08-01 NOTE — PROGRESS NOTES
[facility] - PRIMARY CARE SKIN    CC : acne, mole  SUBJECTIVE:                                                    Jannie Dexter is a 38 year old female who presents to clinic today for refill on her spironolactone 100 mg per day. This has controlled her acne. No side affects.    she has tried the following without success : topical tretinoins and ocp, spironolactone.  Contraception : 1) IUD Mirena, 2) condoms    Issue Two : Jannie requests evaluation of a lesion on the left thigh that bleeds when she shaves.      Personal Medical History         Occupation : indoor     Refer to electronic medical record (EMR) for past medical history and medications.    INTEGUMENTARY/SKIN: POSITIVE for acne   ROS : 14 point review of systems was negative except the symptoms listed above in the HPI.          OBJECTIVE:                                                    GENERAL: healthy, alert and no distress  SKIN: Wong Skin Type - II.  Face and Legs were examined. The dermatoscope was used to help evaluate pigmented lesions.  Skin :                                                   Face : clear                          Left lower leg- 3 mm erythematous, raised lesion consistent with hemangioma  Diagnostic Test Results:  none         ASSESSMENT:                                                      Encounter Diagnoses   Name Primary?     Acne vulgaris Yes     Capillary hemangioma      Eczema, unspecified type          PLAN:                                                    Patient Instructions   Recheck in one year  Spironolactone 100 mg one tab per day          PROCEDURES:                                                    Name : Cautery.  Indication : cap hemangioma.  Location(s) : left lower leg.  Completed by : Tabatha Madison MD  Note : Prior to treatment, I discussed the risk of pain, blistering, infection, scarring, hypopigmentation, hyperpigmentation and recurrence or need for retreatment. Benefits of treatment  "and alternative treatments were also discussed. Infiltrated with 1 % lidocaine.    Clean technique was used throughout the procedure. Skin was cleaned and prepped with surgical cleanser. Cauterized with Hyfrecator \"cat whisker\" at 6 setting.  Antibiotic salve and dressing were applied.    Patient tolerated the procedure well and left in satisfactory condition.      TT : 20 minutes.  CT : 15 minutes.          "

## 2018-08-01 NOTE — LETTER
8/1/2018         RE: Jannie Dexter  [address] [place] [address]        Dear Colleague,    [facility] Please see a copy of my visit note below.    [facility] - PRIMARY CARE SKIN    CC : acne, mole  SUBJECTIVE:                                                    Jannie Dexter is a 38 year old female who presents to clinic today for refill on her spironolactone 100 mg per day. This has controlled her acne. No side affects.    she has tried the following without success : topical tretinoins and ocp, spironolactone.  Contraception : 1) IUD Mirena, 2) condoms    Issue Two : Jannie requests evaluation of a lesion on the left thigh that bleeds when she shaves.      Personal Medical History         Occupation : indoor     Refer to electronic medical record (EMR) for past medical history and medications.    INTEGUMENTARY/SKIN: POSITIVE for acne   ROS : 14 point review of systems was negative except the symptoms listed above in the HPI.          OBJECTIVE:                                                    GENERAL: healthy, alert and no distress  SKIN: Wong Skin Type - II.  Face and Legs were examined. The dermatoscope was used to help evaluate pigmented lesions.  Skin :                                                   Face : clear                          Left lower leg- 3 mm erythematous, raised lesion consistent with hemangioma  Diagnostic Test Results:  none         ASSESSMENT:                                                      Encounter Diagnoses   Name Primary?     Acne vulgaris Yes     Capillary hemangioma      Eczema, unspecified type          PLAN:                                                    Patient Instructions   Recheck in one year  Spironolactone 100 mg one tab per day          PROCEDURES:                                                    Name : Cautery.  Indication : cap  "hemangioma.  Location(s) : left lower leg.  Completed by : Tabatha Madison MD  Note : Prior to treatment, I discussed the risk of pain, blistering, infection, scarring, hypopigmentation, hyperpigmentation and recurrence or need for retreatment. Benefits of treatment and alternative treatments were also discussed. Infiltrated with 1 % lidocaine.    Clean technique was used throughout the procedure. Skin was cleaned and prepped with surgical cleanser. Cauterized with Hyfrecator \"cat whisker\" at 6 setting.  Antibiotic salve and dressing were applied.    Patient tolerated the procedure well and left in satisfactory condition.      TT : 20 minutes.  CT : 15 minutes.            Again, thank you for allowing me to participate in the care of your patient.        Sincerely,        Beatrice Madison MD    "

## 2018-08-01 NOTE — MR AVS SNAPSHOT
After Visit Summary   8/1/2018    Jannie Dexter    MRN: [id number]           Patient Information     Date Of Birth          1980        Visit Information        Provider Department      8/1/2018 2:20 PM Beatrice Madison MD [facility] [place]        Today's Diagnoses     Acne vulgaris    -  1    Capillary hemangioma        Eczema, unspecified type          Care Instructions    Recheck in one year  Spironolactone 100 mg one tab per day            Follow-ups after your visit        Who to contact     If you have questions or need follow up information about today's clinic visit or your schedule please contact [facility] directly at [phone].  Normal or non-critical lab and imaging results will be communicated to you by MyChart, letter or phone within 4 business days after the clinic has received the results. If you do not hear from us within 7 days, please contact the clinic through [employer] or phone. If you have a critical or abnormal lab result, we will notify you by phone as soon as possible.  Submit refill requests through [email or web address] or call your pharmacy and they will forward the refill request to us. Please allow 3 business days for your refill to be completed.          Additional Information About Your Visit        MyChart Information     [email or web address] gives you secure access to your electronic health record. If you see a primary care provider, you can also send messages to your care team and make appointments. If you have questions, please call your primary care clinic.  If you do not have a primary care provider, please call [phone] and they will assist you.        Care EveryWhere ID     This is your Care EveryWhere ID. This could be used by other organizations to access your [place] medical records  [id number]        Your Vitals Were     Pulse Pulse Oximetry                93 98%           Blood Pressure from Last 3 Encounters:   08/01/18 111/78    03/23/17 118/81   06/03/14 112/76    Weight from Last 3 Encounters:   06/03/14 125 lb 14.4 oz (57.1 kg)   02/20/14 125 lb (56.7 kg)   12/31/13 131 lb 12.8 oz (59.8 kg)              We Performed the Following     Creatinine     DESTRUCT BENIGN LESION, UP TO 14     Potassium          Today's Medication Changes          These changes are accurate as of 8/1/18  3:15 PM.  If you have any questions, ask your nurse or doctor.               Start taking these medicines.        Dose/Directions    triamcinolone 0.1 % cream   Commonly known as:  KENALOG   Used for:  Eczema, unspecified type   Started by:  Beatrice Madison MD        Apply to affected area in axilla bid for 7-10 consecutive days, not to be used on face or groin   Quantity:  15 g   Refills:  1         These medicines have changed or have updated prescriptions.        Dose/Directions    spironolactone 100 MG tablet   Commonly known as:  ALDACTONE   This may have changed:  See the new instructions.   Used for:  Acne vulgaris   Changed by:  Beatrice Madison MD        Dose:  100 mg   Take 1 tablet (100 mg) by mouth daily   Quantity:  90 tablet   Refills:  3            Where to get your medicines      These medications were sent to [id number] [facility] - [place], [place] - [address] [address]  [address] [place] [address]     Phone:  [phone]     spironolactone 100 MG tablet    triamcinolone 0.1 % cream                Primary Care Provider Office Phone # Fax #    [facility] [phone] [address] [facility] [address]        Equal Access to Services     [facility]: Hadii gurmeet murphy hadashjaqueline Soalisson, waaxda luqadaha, qaybta kaalmada mouna, orlando gabriel. So [facility] [phone].    ATENCIÓN: Si habla español, tiene a duarte disposición servicios gratuitos de asistencia lingüística. Beth al [phone].    We comply with applicable federal civil rights laws and [place]  laws. We do not discriminate on the basis of race, color, national origin, age, disability, sex, sexual orientation, or gender identity.            Thank you!     Thank you for choosing [facility]  for your care. Our goal is always to provide you with excellent care. Hearing back from our patients is one way we can continue to improve our services. Please take a few minutes to complete the written survey that you may receive in the mail after your visit with us. Thank you!             Your Updated Medication List - Protect others around you: Learn how to safely use, store and throw away your medicines at www.disposemymeds.org.          This list is accurate as of 8/1/18  3:15 PM.  Always use your most recent med list.                   Brand Name Dispense Instructions for use Diagnosis    albuterol 108 (90 Base) MCG/ACT Inhaler    PROAIR HFA/PROVENTIL HFA/VENTOLIN HFA    1 Inhaler    Inhale 2 puffs into the lungs every 6 hours as needed for shortness of breath / dyspnea    Exercise-induced asthma       cetirizine 10 MG tablet    zyrTEC    60 tablet    TAKE ONE TABLET BY MOUTH ONE TIME DAILY IN THE P.M.    Atopic dermatitis, unspecified type       fluticasone 50 MCG/ACT spray    FLONASE    1 Bottle    Spray 1-2 sprays into both nostrils daily    Seasonal allergic rhinitis, unspecified allergic rhinitis trigger       spironolactone 100 MG tablet    ALDACTONE    90 tablet    Take 1 tablet (100 mg) by mouth daily    Acne vulgaris       triamcinolone 0.1 % cream    KENALOG    15 g    Apply to affected area in axilla bid for 7-10 consecutive days, not to be used on face or groin    Eczema, unspecified type

## 2018-08-02 LAB
CREAT SERPL-MCNC: 0.73 MG/DL (ref 0.52–1.04)
GFR SERPL CREATININE-BSD FRML MDRD: 90 ML/MIN/1.7M2
POTASSIUM SERPL-SCNC: 4.5 MMOL/L (ref 3.4–5.3)

## 2018-08-07 ENCOUNTER — HEALTH MAINTENANCE LETTER (OUTPATIENT)
Age: 38
End: 2018-08-07

## 2019-02-01 DIAGNOSIS — J45.990 EXERCISE-INDUCED ASTHMA: ICD-10-CM

## 2019-02-01 NOTE — TELEPHONE ENCOUNTER

## 2019-02-04 RX ORDER — ALBUTEROL SULFATE 90 UG/1
2 AEROSOL, METERED RESPIRATORY (INHALATION) EVERY 6 HOURS PRN
Qty: 1 INHALER | Refills: 0 | Status: SHIPPED | OUTPATIENT
Start: 2019-02-04

## 2019-02-18 DIAGNOSIS — L20.9 ATOPIC DERMATITIS, UNSPECIFIED TYPE: ICD-10-CM

## 2019-02-19 RX ORDER — CETIRIZINE HYDROCHLORIDE 10 MG/1
TABLET ORAL
Qty: 60 TABLET | Refills: 3 | Status: SHIPPED | OUTPATIENT
Start: 2019-02-19

## 2019-02-19 NOTE — TELEPHONE ENCOUNTER
"Requested Prescriptions   Pending Prescriptions Disp Refills     cetirizine (ZYRTEC) 10 MG tablet [Pharmacy Med Name: CETIRIZINE HCL 10 MG TABLET] 60 tablet 3     Sig: TAKE ONE TABLET BY MOUTH ONE TIME DAILY IN THE P.M.    Antihistamines Protocol Passed - 2/18/2019  5:37 PM       Passed - Patient is 3-64 years of age    Apply weight-based dosing for peds patients age 3 - 12 years of age.    Forward request to provider for patients under the age of 3 or over the age of 64.         Passed - Recent (12 mo) or future (30 days) visit within the authorizing provider's specialty    Patient had office visit in the last 12 months or has a visit in the next 30 days with authorizing provider or within the authorizing provider's specialty.  See \"Patient Info\" tab in inbasket, or \"Choose Columns\" in Meds & Orders section of the refill encounter.             Passed - Medication is active on med list        cetirizine (ZYRTEC) 10 MG tablet 60 tablet 4 4/30/2018       Last Written Prescription Date:  04/30/2018  Last Fill Quantity: 60,  # refills: 4   Last office visit: 8/1/2018 with prescribing provider:  Dr. Madison   Future Office Visit:  Unknown     "

## 2019-12-30 DIAGNOSIS — L20.9 ATOPIC DERMATITIS, UNSPECIFIED TYPE: ICD-10-CM

## 2019-12-30 RX ORDER — CETIRIZINE HYDROCHLORIDE 10 MG/1
TABLET ORAL
Qty: 90 TABLET | Refills: 2 | OUTPATIENT
Start: 2019-12-30

## 2019-12-30 NOTE — TELEPHONE ENCOUNTER
"    Last Written Prescription Date:  2/19/19  Last Fill Quantity: 60,  # refills: 3   Last office visit: 8/1/2018 with prescribing provider:  =   Future Office Visit:    Requested Prescriptions   Pending Prescriptions Disp Refills     cetirizine (ZYRTEC) 10 MG tablet [Pharmacy Med Name: CETIRIZINE HCL 10 MG TABLET] 90 tablet 2     Sig: TAKE ONE TABLET BY MOUTH ONE TIME DAILY IN THE P.M.       Antihistamines Protocol Failed - 12/30/2019  2:08 AM        Failed - Recent (12 mo) or future (30 days) visit within the authorizing provider's specialty     Patient has had an office visit with the authorizing provider or a provider within the authorizing providers department within the previous 12 mos or has a future within next 30 days. See \"Patient Info\" tab in inbasket, or \"Choose Columns\" in Meds & Orders section of the refill encounter.              Passed - Patient is 3-64 years of age     Apply weight-based dosing for peds patients age 3 - 12 years of age.    Forward request to provider for patients under the age of 3 or over the age of 64.          Passed - Medication is active on med list          "

## 2020-03-02 ENCOUNTER — HEALTH MAINTENANCE LETTER (OUTPATIENT)
Age: 40
End: 2020-03-02